# Patient Record
Sex: FEMALE | Race: WHITE | Employment: FULL TIME | URBAN - METROPOLITAN AREA
[De-identification: names, ages, dates, MRNs, and addresses within clinical notes are randomized per-mention and may not be internally consistent; named-entity substitution may affect disease eponyms.]

---

## 2019-01-28 ENCOUNTER — HOSPITAL ENCOUNTER (OUTPATIENT)
Age: 49
Setting detail: OBSERVATION
Discharge: HOME | End: 2019-01-29
Attending: EMERGENCY MEDICINE | Admitting: EMERGENCY MEDICINE
Payer: COMMERCIAL

## 2019-01-28 ENCOUNTER — APPOINTMENT (OUTPATIENT)
Dept: CT IMAGING | Age: 49
End: 2019-01-28
Payer: COMMERCIAL

## 2019-01-28 ENCOUNTER — APPOINTMENT (OUTPATIENT)
Dept: GENERAL RADIOLOGY | Age: 49
End: 2019-01-28
Payer: COMMERCIAL

## 2019-01-28 DIAGNOSIS — R07.89 ATYPICAL CHEST PAIN: Primary | ICD-10-CM

## 2019-01-28 LAB
ABSOLUTE EOS #: 0.09 K/UL (ref 0–0.44)
ABSOLUTE IMMATURE GRANULOCYTE: 0.03 K/UL (ref 0–0.3)
ABSOLUTE LYMPH #: 2.51 K/UL (ref 1.1–3.7)
ABSOLUTE MONO #: 0.6 K/UL (ref 0.1–1.2)
ANION GAP SERPL CALCULATED.3IONS-SCNC: 13 MMOL/L (ref 9–17)
BASOPHILS # BLD: 1 % (ref 0–2)
BASOPHILS ABSOLUTE: 0.06 K/UL (ref 0–0.2)
BUN BLDV-MCNC: 11 MG/DL (ref 6–20)
BUN/CREAT BLD: ABNORMAL (ref 9–20)
CALCIUM SERPL-MCNC: 9.6 MG/DL (ref 8.6–10.4)
CHLORIDE BLD-SCNC: 105 MMOL/L (ref 98–107)
CO2: 21 MMOL/L (ref 20–31)
CREAT SERPL-MCNC: 0.63 MG/DL (ref 0.5–0.9)
D-DIMER QUANTITATIVE: 0.62 MG/L FEU
DIFFERENTIAL TYPE: ABNORMAL
EOSINOPHILS RELATIVE PERCENT: 1 % (ref 1–4)
GFR AFRICAN AMERICAN: >60 ML/MIN
GFR NON-AFRICAN AMERICAN: >60 ML/MIN
GFR SERPL CREATININE-BSD FRML MDRD: ABNORMAL ML/MIN/{1.73_M2}
GFR SERPL CREATININE-BSD FRML MDRD: ABNORMAL ML/MIN/{1.73_M2}
GLUCOSE BLD-MCNC: 127 MG/DL (ref 70–99)
HCT VFR BLD CALC: 42 % (ref 36.3–47.1)
HEMOGLOBIN: 14.1 G/DL (ref 11.9–15.1)
IMMATURE GRANULOCYTES: 0 %
LYMPHOCYTES # BLD: 27 % (ref 24–43)
MCH RBC QN AUTO: 32.6 PG (ref 25.2–33.5)
MCHC RBC AUTO-ENTMCNC: 33.6 G/DL (ref 28.4–34.8)
MCV RBC AUTO: 97.2 FL (ref 82.6–102.9)
MONOCYTES # BLD: 6 % (ref 3–12)
NRBC AUTOMATED: 0 PER 100 WBC
PDW BLD-RTO: 11.6 % (ref 11.8–14.4)
PLATELET # BLD: 375 K/UL (ref 138–453)
PLATELET ESTIMATE: ABNORMAL
PMV BLD AUTO: 9.6 FL (ref 8.1–13.5)
POTASSIUM SERPL-SCNC: 4.2 MMOL/L (ref 3.7–5.3)
RBC # BLD: 4.32 M/UL (ref 3.95–5.11)
RBC # BLD: ABNORMAL 10*6/UL
SEG NEUTROPHILS: 65 % (ref 36–65)
SEGMENTED NEUTROPHILS ABSOLUTE COUNT: 6.15 K/UL (ref 1.5–8.1)
SODIUM BLD-SCNC: 139 MMOL/L (ref 135–144)
TROPONIN INTERP: ABNORMAL
TROPONIN T: ABNORMAL NG/ML
TROPONIN, HIGH SENSITIVITY: 31 NG/L (ref 0–14)
TROPONIN, HIGH SENSITIVITY: 35 NG/L (ref 0–14)
TROPONIN, HIGH SENSITIVITY: 36 NG/L (ref 0–14)
TROPONIN, HIGH SENSITIVITY: 41 NG/L (ref 0–14)
WBC # BLD: 9.4 K/UL (ref 3.5–11.3)
WBC # BLD: ABNORMAL 10*3/UL

## 2019-01-28 PROCEDURE — 84484 ASSAY OF TROPONIN QUANT: CPT

## 2019-01-28 PROCEDURE — 6370000000 HC RX 637 (ALT 250 FOR IP): Performed by: STUDENT IN AN ORGANIZED HEALTH CARE EDUCATION/TRAINING PROGRAM

## 2019-01-28 PROCEDURE — 6360000004 HC RX CONTRAST MEDICATION: Performed by: EMERGENCY MEDICINE

## 2019-01-28 PROCEDURE — 2580000003 HC RX 258: Performed by: STUDENT IN AN ORGANIZED HEALTH CARE EDUCATION/TRAINING PROGRAM

## 2019-01-28 PROCEDURE — 96374 THER/PROPH/DIAG INJ IV PUSH: CPT

## 2019-01-28 PROCEDURE — 93005 ELECTROCARDIOGRAM TRACING: CPT

## 2019-01-28 PROCEDURE — 36415 COLL VENOUS BLD VENIPUNCTURE: CPT

## 2019-01-28 PROCEDURE — 85025 COMPLETE CBC W/AUTO DIFF WBC: CPT

## 2019-01-28 PROCEDURE — G0378 HOSPITAL OBSERVATION PER HR: HCPCS

## 2019-01-28 PROCEDURE — 85379 FIBRIN DEGRADATION QUANT: CPT

## 2019-01-28 PROCEDURE — 71260 CT THORAX DX C+: CPT

## 2019-01-28 PROCEDURE — 71046 X-RAY EXAM CHEST 2 VIEWS: CPT

## 2019-01-28 PROCEDURE — G0384 LEV 5 HOSP TYPE B ED VISIT: HCPCS

## 2019-01-28 PROCEDURE — 80048 BASIC METABOLIC PNL TOTAL CA: CPT

## 2019-01-28 PROCEDURE — 6360000002 HC RX W HCPCS: Performed by: STUDENT IN AN ORGANIZED HEALTH CARE EDUCATION/TRAINING PROGRAM

## 2019-01-28 RX ORDER — LORAZEPAM 2 MG/ML
1 INJECTION INTRAMUSCULAR ONCE
Status: COMPLETED | OUTPATIENT
Start: 2019-01-28 | End: 2019-01-28

## 2019-01-28 RX ORDER — LORAZEPAM 0.5 MG/1
1 TABLET ORAL ONCE
Status: DISCONTINUED | OUTPATIENT
Start: 2019-01-28 | End: 2019-01-28

## 2019-01-28 RX ORDER — SODIUM CHLORIDE 0.9 % (FLUSH) 0.9 %
10 SYRINGE (ML) INJECTION PRN
Status: DISCONTINUED | OUTPATIENT
Start: 2019-01-28 | End: 2019-01-29 | Stop reason: HOSPADM

## 2019-01-28 RX ORDER — CEFUROXIME AXETIL 250 MG/1
250 TABLET ORAL 2 TIMES DAILY
Status: DISCONTINUED | OUTPATIENT
Start: 2019-01-28 | End: 2019-01-29 | Stop reason: HOSPADM

## 2019-01-28 RX ORDER — SODIUM CHLORIDE 0.9 % (FLUSH) 0.9 %
10 SYRINGE (ML) INJECTION EVERY 12 HOURS SCHEDULED
Status: DISCONTINUED | OUTPATIENT
Start: 2019-01-28 | End: 2019-01-29 | Stop reason: HOSPADM

## 2019-01-28 RX ORDER — ASPIRIN 81 MG/1
324 TABLET, CHEWABLE ORAL ONCE
Status: COMPLETED | OUTPATIENT
Start: 2019-01-28 | End: 2019-01-28

## 2019-01-28 RX ORDER — CEFUROXIME AXETIL 250 MG/1
250 TABLET ORAL 2 TIMES DAILY
COMMUNITY
Start: 2019-01-28 | End: 2019-01-31

## 2019-01-28 RX ORDER — NAPROXEN 250 MG/1
250 TABLET ORAL 3 TIMES DAILY PRN
Status: DISCONTINUED | OUTPATIENT
Start: 2019-01-28 | End: 2019-01-29 | Stop reason: HOSPADM

## 2019-01-28 RX ADMIN — IOVERSOL 75 ML: 741 INJECTION INTRA-ARTERIAL; INTRAVENOUS at 13:26

## 2019-01-28 RX ADMIN — CEFUROXIME AXETIL 250 MG: 250 TABLET ORAL at 20:50

## 2019-01-28 RX ADMIN — LORAZEPAM 1 MG: 2 INJECTION INTRAMUSCULAR; INTRAVENOUS at 11:53

## 2019-01-28 RX ADMIN — ASPIRIN 81 MG 324 MG: 81 TABLET ORAL at 11:53

## 2019-01-28 RX ADMIN — NAPROXEN 250 MG: 250 TABLET ORAL at 17:30

## 2019-01-28 RX ADMIN — Medication 10 ML: at 20:50

## 2019-01-28 ASSESSMENT — PAIN DESCRIPTION - PROGRESSION: CLINICAL_PROGRESSION: NOT CHANGED

## 2019-01-28 ASSESSMENT — PAIN DESCRIPTION - ORIENTATION: ORIENTATION: LEFT;LOWER

## 2019-01-28 ASSESSMENT — PAIN DESCRIPTION - LOCATION: LOCATION: BACK

## 2019-01-28 ASSESSMENT — ENCOUNTER SYMPTOMS
WHEEZING: 0
CHEST TIGHTNESS: 1
COUGH: 0
ABDOMINAL PAIN: 0
SHORTNESS OF BREATH: 0
NAUSEA: 0
BACK PAIN: 1
VOMITING: 0
TROUBLE SWALLOWING: 0
ABDOMINAL DISTENTION: 0
SORE THROAT: 0

## 2019-01-28 ASSESSMENT — PAIN DESCRIPTION - DESCRIPTORS: DESCRIPTORS: ACHING;SHARP

## 2019-01-28 ASSESSMENT — PAIN DESCRIPTION - ONSET: ONSET: ON-GOING

## 2019-01-28 ASSESSMENT — PAIN SCALES - GENERAL
PAINLEVEL_OUTOF10: 6
PAINLEVEL_OUTOF10: 5

## 2019-01-28 ASSESSMENT — PAIN - FUNCTIONAL ASSESSMENT: PAIN_FUNCTIONAL_ASSESSMENT: PREVENTS OR INTERFERES SOME ACTIVE ACTIVITIES AND ADLS

## 2019-01-28 ASSESSMENT — PAIN DESCRIPTION - PAIN TYPE: TYPE: ACUTE PAIN

## 2019-01-28 ASSESSMENT — PAIN DESCRIPTION - FREQUENCY: FREQUENCY: CONTINUOUS

## 2019-01-28 ASSESSMENT — HEART SCORE: ECG: 0

## 2019-01-29 ENCOUNTER — APPOINTMENT (OUTPATIENT)
Dept: CARDIAC CATH/INVASIVE PROCEDURES | Age: 49
End: 2019-01-29
Payer: COMMERCIAL

## 2019-01-29 VITALS
BODY MASS INDEX: 17.84 KG/M2 | RESPIRATION RATE: 18 BRPM | HEART RATE: 77 BPM | OXYGEN SATURATION: 98 % | TEMPERATURE: 99 F | WEIGHT: 111 LBS | DIASTOLIC BLOOD PRESSURE: 61 MMHG | HEIGHT: 66 IN | SYSTOLIC BLOOD PRESSURE: 88 MMHG

## 2019-01-29 LAB
EKG ATRIAL RATE: 67 BPM
EKG ATRIAL RATE: 73 BPM
EKG ATRIAL RATE: 86 BPM
EKG P AXIS: 39 DEGREES
EKG P AXIS: 57 DEGREES
EKG P AXIS: 90 DEGREES
EKG P-R INTERVAL: 138 MS
EKG P-R INTERVAL: 166 MS
EKG P-R INTERVAL: 174 MS
EKG Q-T INTERVAL: 380 MS
EKG Q-T INTERVAL: 400 MS
EKG Q-T INTERVAL: 408 MS
EKG QRS DURATION: 82 MS
EKG QTC CALCULATION (BAZETT): 431 MS
EKG QTC CALCULATION (BAZETT): 440 MS
EKG QTC CALCULATION (BAZETT): 454 MS
EKG R AXIS: 24 DEGREES
EKG R AXIS: 32 DEGREES
EKG R AXIS: 46 DEGREES
EKG T AXIS: 54 DEGREES
EKG T AXIS: 56 DEGREES
EKG T AXIS: 83 DEGREES
EKG VENTRICULAR RATE: 67 BPM
EKG VENTRICULAR RATE: 73 BPM
EKG VENTRICULAR RATE: 86 BPM
HCG, PREGNANCY URINE (POC): NEGATIVE
LV EF: 55 %
LV EF: 55 %
LVEF MODALITY: NORMAL
LVEF MODALITY: NORMAL
TROPONIN INTERP: ABNORMAL
TROPONIN T: ABNORMAL NG/ML
TROPONIN, HIGH SENSITIVITY: 30 NG/L (ref 0–14)

## 2019-01-29 PROCEDURE — 93306 TTE W/DOPPLER COMPLETE: CPT

## 2019-01-29 PROCEDURE — 84484 ASSAY OF TROPONIN QUANT: CPT

## 2019-01-29 PROCEDURE — C1760 CLOSURE DEV, VASC: HCPCS

## 2019-01-29 PROCEDURE — G0378 HOSPITAL OBSERVATION PER HR: HCPCS

## 2019-01-29 PROCEDURE — 2709999900 HC NON-CHARGEABLE SUPPLY

## 2019-01-29 PROCEDURE — 2500000003 HC RX 250 WO HCPCS

## 2019-01-29 PROCEDURE — 6360000002 HC RX W HCPCS

## 2019-01-29 PROCEDURE — C1894 INTRO/SHEATH, NON-LASER: HCPCS

## 2019-01-29 PROCEDURE — 93458 L HRT ARTERY/VENTRICLE ANGIO: CPT

## 2019-01-29 PROCEDURE — 2580000003 HC RX 258: Performed by: STUDENT IN AN ORGANIZED HEALTH CARE EDUCATION/TRAINING PROGRAM

## 2019-01-29 PROCEDURE — 6360000004 HC RX CONTRAST MEDICATION

## 2019-01-29 PROCEDURE — 93226 XTRNL ECG REC<48 HR SCAN A/R: CPT

## 2019-01-29 PROCEDURE — 93005 ELECTROCARDIOGRAM TRACING: CPT

## 2019-01-29 PROCEDURE — 93225 XTRNL ECG REC<48 HRS REC: CPT

## 2019-01-29 PROCEDURE — C1769 GUIDE WIRE: HCPCS

## 2019-01-29 PROCEDURE — 36415 COLL VENOUS BLD VENIPUNCTURE: CPT

## 2019-01-29 PROCEDURE — 84703 CHORIONIC GONADOTROPIN ASSAY: CPT

## 2019-01-29 RX ORDER — ACETAMINOPHEN 325 MG/1
650 TABLET ORAL EVERY 4 HOURS PRN
Status: DISCONTINUED | OUTPATIENT
Start: 2019-01-29 | End: 2019-01-29 | Stop reason: HOSPADM

## 2019-01-29 RX ORDER — SODIUM CHLORIDE 0.9 % (FLUSH) 0.9 %
10 SYRINGE (ML) INJECTION PRN
Status: DISCONTINUED | OUTPATIENT
Start: 2019-01-29 | End: 2019-01-29 | Stop reason: HOSPADM

## 2019-01-29 RX ORDER — SODIUM CHLORIDE 0.9 % (FLUSH) 0.9 %
10 SYRINGE (ML) INJECTION EVERY 12 HOURS SCHEDULED
Status: DISCONTINUED | OUTPATIENT
Start: 2019-01-29 | End: 2019-01-29 | Stop reason: HOSPADM

## 2019-01-29 RX ADMIN — Medication 10 ML: at 09:00

## 2019-01-29 ASSESSMENT — PAIN DESCRIPTION - PAIN TYPE: TYPE: ACUTE PAIN

## 2019-01-29 ASSESSMENT — PAIN DESCRIPTION - FREQUENCY: FREQUENCY: INTERMITTENT

## 2019-01-29 ASSESSMENT — PAIN SCALES - GENERAL: PAINLEVEL_OUTOF10: 5

## 2019-01-29 ASSESSMENT — PAIN DESCRIPTION - ORIENTATION: ORIENTATION: MID;LEFT

## 2019-01-29 ASSESSMENT — PAIN - FUNCTIONAL ASSESSMENT: PAIN_FUNCTIONAL_ASSESSMENT: PREVENTS OR INTERFERES SOME ACTIVE ACTIVITIES AND ADLS

## 2019-01-29 ASSESSMENT — PAIN DESCRIPTION - ONSET: ONSET: ON-GOING

## 2019-01-29 ASSESSMENT — PAIN DESCRIPTION - LOCATION: LOCATION: CHEST

## 2019-01-29 ASSESSMENT — PAIN DESCRIPTION - PROGRESSION: CLINICAL_PROGRESSION: NOT CHANGED

## 2019-01-29 ASSESSMENT — PAIN DESCRIPTION - DESCRIPTORS: DESCRIPTORS: PRESSURE

## 2019-02-01 ENCOUNTER — HOSPITAL ENCOUNTER (OUTPATIENT)
Dept: VASCULAR LAB | Age: 49
Discharge: HOME | End: 2019-02-01
Payer: COMMERCIAL

## 2019-02-01 PROCEDURE — 93971 EXTREMITY STUDY: CPT

## 2019-02-04 ENCOUNTER — HOSPITAL ENCOUNTER (EMERGENCY)
Age: 49
Discharge: HOME | End: 2019-02-04
Attending: EMERGENCY MEDICINE
Payer: COMMERCIAL

## 2019-02-04 VITALS
HEART RATE: 100 BPM | OXYGEN SATURATION: 100 % | BODY MASS INDEX: 18.49 KG/M2 | DIASTOLIC BLOOD PRESSURE: 87 MMHG | SYSTOLIC BLOOD PRESSURE: 122 MMHG | TEMPERATURE: 98.2 F | WEIGHT: 111 LBS | HEIGHT: 65 IN | RESPIRATION RATE: 19 BRPM

## 2019-02-04 DIAGNOSIS — R10.13 ABDOMINAL PAIN, EPIGASTRIC: Primary | ICD-10-CM

## 2019-02-04 DIAGNOSIS — R13.10 DYSPHAGIA, UNSPECIFIED TYPE: ICD-10-CM

## 2019-02-04 LAB
ABSOLUTE EOS #: 0.06 K/UL (ref 0–0.44)
ABSOLUTE IMMATURE GRANULOCYTE: 0.04 K/UL (ref 0–0.3)
ABSOLUTE LYMPH #: 1.67 K/UL (ref 1.1–3.7)
ABSOLUTE MONO #: 0.59 K/UL (ref 0.1–1.2)
ALBUMIN SERPL-MCNC: 4.9 G/DL (ref 3.5–5.2)
ALBUMIN/GLOBULIN RATIO: 1.4 (ref 1–2.5)
ALP BLD-CCNC: 69 U/L (ref 35–104)
ALT SERPL-CCNC: 12 U/L (ref 5–33)
ANION GAP SERPL CALCULATED.3IONS-SCNC: 12 MMOL/L (ref 9–17)
AST SERPL-CCNC: 20 U/L
BASOPHILS # BLD: 1 % (ref 0–2)
BASOPHILS ABSOLUTE: 0.04 K/UL (ref 0–0.2)
BILIRUB SERPL-MCNC: 0.5 MG/DL (ref 0.3–1.2)
BILIRUBIN URINE: NEGATIVE
BUN BLDV-MCNC: 9 MG/DL (ref 6–20)
BUN/CREAT BLD: ABNORMAL (ref 9–20)
CALCIUM SERPL-MCNC: 9.4 MG/DL (ref 8.6–10.4)
CHLORIDE BLD-SCNC: 103 MMOL/L (ref 98–107)
CO2: 23 MMOL/L (ref 20–31)
COLOR: YELLOW
COMMENT UA: NORMAL
CREAT SERPL-MCNC: 0.61 MG/DL (ref 0.5–0.9)
DIFFERENTIAL TYPE: ABNORMAL
EKG ATRIAL RATE: 81 BPM
EKG P AXIS: 79 DEGREES
EKG P-R INTERVAL: 134 MS
EKG Q-T INTERVAL: 370 MS
EKG QRS DURATION: 82 MS
EKG QTC CALCULATION (BAZETT): 429 MS
EKG R AXIS: 48 DEGREES
EKG T AXIS: 66 DEGREES
EKG VENTRICULAR RATE: 81 BPM
EOSINOPHILS RELATIVE PERCENT: 1 % (ref 1–4)
GFR AFRICAN AMERICAN: >60 ML/MIN
GFR NON-AFRICAN AMERICAN: >60 ML/MIN
GFR SERPL CREATININE-BSD FRML MDRD: ABNORMAL ML/MIN/{1.73_M2}
GFR SERPL CREATININE-BSD FRML MDRD: ABNORMAL ML/MIN/{1.73_M2}
GLUCOSE BLD-MCNC: 113 MG/DL (ref 70–99)
GLUCOSE URINE: NEGATIVE
HCG(URINE) PREGNANCY TEST: NEGATIVE
HCT VFR BLD CALC: 40.1 % (ref 36.3–47.1)
HEMOGLOBIN: 13.6 G/DL (ref 11.9–15.1)
IMMATURE GRANULOCYTES: 1 %
KETONES, URINE: NEGATIVE
LEUKOCYTE ESTERASE, URINE: NEGATIVE
LIPASE: 20 U/L (ref 13–60)
LYMPHOCYTES # BLD: 20 % (ref 24–43)
MCH RBC QN AUTO: 33.3 PG (ref 25.2–33.5)
MCHC RBC AUTO-ENTMCNC: 33.9 G/DL (ref 28.4–34.8)
MCV RBC AUTO: 98.3 FL (ref 82.6–102.9)
MONOCYTES # BLD: 7 % (ref 3–12)
NITRITE, URINE: NEGATIVE
NRBC AUTOMATED: 0 PER 100 WBC
PDW BLD-RTO: 11.4 % (ref 11.8–14.4)
PH UA: 6.5 (ref 5–8)
PLATELET # BLD: 384 K/UL (ref 138–453)
PLATELET ESTIMATE: ABNORMAL
PMV BLD AUTO: 9.1 FL (ref 8.1–13.5)
POTASSIUM SERPL-SCNC: 3.5 MMOL/L (ref 3.7–5.3)
PROTEIN UA: NEGATIVE
RBC # BLD: 4.08 M/UL (ref 3.95–5.11)
RBC # BLD: ABNORMAL 10*6/UL
SEG NEUTROPHILS: 70 % (ref 36–65)
SEGMENTED NEUTROPHILS ABSOLUTE COUNT: 5.8 K/UL (ref 1.5–8.1)
SODIUM BLD-SCNC: 138 MMOL/L (ref 135–144)
SPECIFIC GRAVITY UA: 1 (ref 1–1.03)
TOTAL PROTEIN: 8.4 G/DL (ref 6.4–8.3)
TSH SERPL DL<=0.05 MIU/L-ACNC: 2.33 MIU/L (ref 0.3–5)
TURBIDITY: CLEAR
URINE HGB: NEGATIVE
UROBILINOGEN, URINE: NORMAL
WBC # BLD: 8.2 K/UL (ref 3.5–11.3)
WBC # BLD: ABNORMAL 10*3/UL

## 2019-02-04 PROCEDURE — 84443 ASSAY THYROID STIM HORMONE: CPT

## 2019-02-04 PROCEDURE — 93005 ELECTROCARDIOGRAM TRACING: CPT

## 2019-02-04 PROCEDURE — 2580000003 HC RX 258: Performed by: NURSE PRACTITIONER

## 2019-02-04 PROCEDURE — 83690 ASSAY OF LIPASE: CPT

## 2019-02-04 PROCEDURE — 80053 COMPREHEN METABOLIC PANEL: CPT

## 2019-02-04 PROCEDURE — 84703 CHORIONIC GONADOTROPIN ASSAY: CPT

## 2019-02-04 PROCEDURE — 81003 URINALYSIS AUTO W/O SCOPE: CPT

## 2019-02-04 PROCEDURE — 85025 COMPLETE CBC W/AUTO DIFF WBC: CPT

## 2019-02-04 PROCEDURE — 99284 EMERGENCY DEPT VISIT MOD MDM: CPT

## 2019-02-04 RX ORDER — 0.9 % SODIUM CHLORIDE 0.9 %
1000 INTRAVENOUS SOLUTION INTRAVENOUS ONCE
Status: COMPLETED | OUTPATIENT
Start: 2019-02-04 | End: 2019-02-04

## 2019-02-04 RX ADMIN — SODIUM CHLORIDE 1000 ML: 9 INJECTION, SOLUTION INTRAVENOUS at 10:48

## 2019-02-04 ASSESSMENT — ENCOUNTER SYMPTOMS
BACK PAIN: 1
SHORTNESS OF BREATH: 0
ABDOMINAL PAIN: 1
VOMITING: 0
NAUSEA: 1
DIARRHEA: 0
COUGH: 0
CONSTIPATION: 0

## 2019-02-04 ASSESSMENT — PAIN DESCRIPTION - DESCRIPTORS: DESCRIPTORS: DISCOMFORT

## 2019-02-04 ASSESSMENT — PAIN SCALES - GENERAL: PAINLEVEL_OUTOF10: 3

## 2019-02-04 ASSESSMENT — PAIN DESCRIPTION - ORIENTATION: ORIENTATION: UPPER

## 2019-02-04 ASSESSMENT — PAIN DESCRIPTION - LOCATION: LOCATION: ABDOMEN

## 2019-02-04 ASSESSMENT — PAIN DESCRIPTION - PAIN TYPE: TYPE: ACUTE PAIN

## 2019-02-18 ENCOUNTER — APPOINTMENT (OUTPATIENT)
Dept: GENERAL RADIOLOGY | Age: 49
End: 2019-02-18
Payer: COMMERCIAL

## 2019-02-18 ENCOUNTER — HOSPITAL ENCOUNTER (OUTPATIENT)
Age: 49
Setting detail: OBSERVATION
Discharge: HOME | End: 2019-02-19
Attending: EMERGENCY MEDICINE | Admitting: EMERGENCY MEDICINE
Payer: COMMERCIAL

## 2019-02-18 DIAGNOSIS — R20.2 TINGLING IN EXTREMITIES: ICD-10-CM

## 2019-02-18 DIAGNOSIS — M79.10 MUSCLE PAIN: Primary | ICD-10-CM

## 2019-02-18 LAB
ABSOLUTE EOS #: 0.06 K/UL (ref 0–0.44)
ABSOLUTE IMMATURE GRANULOCYTE: <0.03 K/UL (ref 0–0.3)
ABSOLUTE LYMPH #: 2.43 K/UL (ref 1.1–3.7)
ABSOLUTE MONO #: 0.64 K/UL (ref 0.1–1.2)
ALBUMIN SERPL-MCNC: 4.4 G/DL (ref 3.5–5.2)
ALBUMIN/GLOBULIN RATIO: 1.4 (ref 1–2.5)
ALP BLD-CCNC: 56 U/L (ref 35–104)
ALT SERPL-CCNC: 10 U/L (ref 5–33)
ANION GAP SERPL CALCULATED.3IONS-SCNC: 12 MMOL/L (ref 9–17)
AST SERPL-CCNC: 15 U/L
BASOPHILS # BLD: 1 % (ref 0–2)
BASOPHILS ABSOLUTE: 0.05 K/UL (ref 0–0.2)
BILIRUB SERPL-MCNC: 0.71 MG/DL (ref 0.3–1.2)
BUN BLDV-MCNC: 9 MG/DL (ref 6–20)
BUN/CREAT BLD: ABNORMAL (ref 9–20)
C-REACTIVE PROTEIN: <0.3 MG/L (ref 0–5)
CALCIUM SERPL-MCNC: 9.3 MG/DL (ref 8.6–10.4)
CHLORIDE BLD-SCNC: 108 MMOL/L (ref 98–107)
CO2: 22 MMOL/L (ref 20–31)
CREAT SERPL-MCNC: 0.59 MG/DL (ref 0.5–0.9)
DIFFERENTIAL TYPE: ABNORMAL
EOSINOPHILS RELATIVE PERCENT: 1 % (ref 1–4)
FOLATE: 12.4 NG/ML
GFR AFRICAN AMERICAN: >60 ML/MIN
GFR NON-AFRICAN AMERICAN: >60 ML/MIN
GFR SERPL CREATININE-BSD FRML MDRD: ABNORMAL ML/MIN/{1.73_M2}
GFR SERPL CREATININE-BSD FRML MDRD: ABNORMAL ML/MIN/{1.73_M2}
GLUCOSE BLD-MCNC: 90 MG/DL (ref 70–99)
HCT VFR BLD CALC: 37.5 % (ref 36.3–47.1)
HEMOGLOBIN: 12.9 G/DL (ref 11.9–15.1)
IMMATURE GRANULOCYTES: 0 %
LYMPHOCYTES # BLD: 29 % (ref 24–43)
MAGNESIUM: 2 MG/DL (ref 1.6–2.6)
MCH RBC QN AUTO: 32.6 PG (ref 25.2–33.5)
MCHC RBC AUTO-ENTMCNC: 34.4 G/DL (ref 28.4–34.8)
MCV RBC AUTO: 94.7 FL (ref 82.6–102.9)
MONOCYTES # BLD: 8 % (ref 3–12)
MYOGLOBIN: <21 NG/ML (ref 25–58)
NRBC AUTOMATED: 0 PER 100 WBC
PDW BLD-RTO: 11.5 % (ref 11.8–14.4)
PHOSPHORUS: 4.1 MG/DL (ref 2.6–4.5)
PLATELET # BLD: 328 K/UL (ref 138–453)
PLATELET ESTIMATE: ABNORMAL
PMV BLD AUTO: 8.9 FL (ref 8.1–13.5)
POTASSIUM SERPL-SCNC: 3.9 MMOL/L (ref 3.7–5.3)
RBC # BLD: 3.96 M/UL (ref 3.95–5.11)
RBC # BLD: ABNORMAL 10*6/UL
SEDIMENTATION RATE, ERYTHROCYTE: 6 MM (ref 0–20)
SEG NEUTROPHILS: 61 % (ref 36–65)
SEGMENTED NEUTROPHILS ABSOLUTE COUNT: 5.27 K/UL (ref 1.5–8.1)
SODIUM BLD-SCNC: 142 MMOL/L (ref 135–144)
TOTAL CK: 54 U/L (ref 26–192)
TOTAL PROTEIN: 7.5 G/DL (ref 6.4–8.3)
TROPONIN INTERP: ABNORMAL
TROPONIN T: ABNORMAL NG/ML
TROPONIN, HIGH SENSITIVITY: 35 NG/L (ref 0–14)
TSH SERPL DL<=0.05 MIU/L-ACNC: 1.85 MIU/L (ref 0.3–5)
VITAMIN B-12: 470 PG/ML (ref 232–1245)
WBC # BLD: 8.5 K/UL (ref 3.5–11.3)
WBC # BLD: ABNORMAL 10*3/UL

## 2019-02-18 PROCEDURE — 2580000003 HC RX 258: Performed by: FAMILY MEDICINE

## 2019-02-18 PROCEDURE — 82550 ASSAY OF CK (CPK): CPT

## 2019-02-18 PROCEDURE — 6370000000 HC RX 637 (ALT 250 FOR IP): Performed by: EMERGENCY MEDICINE

## 2019-02-18 PROCEDURE — G0378 HOSPITAL OBSERVATION PER HR: HCPCS

## 2019-02-18 PROCEDURE — 96374 THER/PROPH/DIAG INJ IV PUSH: CPT

## 2019-02-18 PROCEDURE — 73090 X-RAY EXAM OF FOREARM: CPT

## 2019-02-18 PROCEDURE — 93005 ELECTROCARDIOGRAM TRACING: CPT

## 2019-02-18 PROCEDURE — 71045 X-RAY EXAM CHEST 1 VIEW: CPT

## 2019-02-18 PROCEDURE — 82607 VITAMIN B-12: CPT

## 2019-02-18 PROCEDURE — 85651 RBC SED RATE NONAUTOMATED: CPT

## 2019-02-18 PROCEDURE — 84484 ASSAY OF TROPONIN QUANT: CPT

## 2019-02-18 PROCEDURE — 86038 ANTINUCLEAR ANTIBODIES: CPT

## 2019-02-18 PROCEDURE — 84443 ASSAY THYROID STIM HORMONE: CPT

## 2019-02-18 PROCEDURE — 85025 COMPLETE CBC W/AUTO DIFF WBC: CPT

## 2019-02-18 PROCEDURE — 80053 COMPREHEN METABOLIC PANEL: CPT

## 2019-02-18 PROCEDURE — 82525 ASSAY OF COPPER: CPT

## 2019-02-18 PROCEDURE — 36415 COLL VENOUS BLD VENIPUNCTURE: CPT

## 2019-02-18 PROCEDURE — 2580000003 HC RX 258: Performed by: EMERGENCY MEDICINE

## 2019-02-18 PROCEDURE — 83735 ASSAY OF MAGNESIUM: CPT

## 2019-02-18 PROCEDURE — 83874 ASSAY OF MYOGLOBIN: CPT

## 2019-02-18 PROCEDURE — 84100 ASSAY OF PHOSPHORUS: CPT

## 2019-02-18 PROCEDURE — 6370000000 HC RX 637 (ALT 250 FOR IP): Performed by: FAMILY MEDICINE

## 2019-02-18 PROCEDURE — 83036 HEMOGLOBIN GLYCOSYLATED A1C: CPT

## 2019-02-18 PROCEDURE — 86140 C-REACTIVE PROTEIN: CPT

## 2019-02-18 PROCEDURE — 99285 EMERGENCY DEPT VISIT HI MDM: CPT

## 2019-02-18 PROCEDURE — 6360000002 HC RX W HCPCS: Performed by: EMERGENCY MEDICINE

## 2019-02-18 PROCEDURE — 82746 ASSAY OF FOLIC ACID SERUM: CPT

## 2019-02-18 RX ORDER — SODIUM CHLORIDE 0.9 % (FLUSH) 0.9 %
10 SYRINGE (ML) INJECTION PRN
Status: DISCONTINUED | OUTPATIENT
Start: 2019-02-18 | End: 2019-02-19 | Stop reason: HOSPADM

## 2019-02-18 RX ORDER — ONDANSETRON 2 MG/ML
4 INJECTION INTRAMUSCULAR; INTRAVENOUS EVERY 6 HOURS PRN
Status: DISCONTINUED | OUTPATIENT
Start: 2019-02-18 | End: 2019-02-19 | Stop reason: HOSPADM

## 2019-02-18 RX ORDER — LABETALOL HYDROCHLORIDE 5 MG/ML
10 INJECTION, SOLUTION INTRAVENOUS EVERY 10 MIN PRN
Status: DISCONTINUED | OUTPATIENT
Start: 2019-02-18 | End: 2019-02-19 | Stop reason: HOSPADM

## 2019-02-18 RX ORDER — ALPRAZOLAM 0.25 MG/1
0.25 TABLET ORAL NIGHTLY PRN
COMMUNITY
End: 2020-11-10 | Stop reason: SDUPTHER

## 2019-02-18 RX ORDER — ASPIRIN 81 MG/1
81 TABLET ORAL DAILY
Status: DISCONTINUED | OUTPATIENT
Start: 2019-02-18 | End: 2019-02-19 | Stop reason: HOSPADM

## 2019-02-18 RX ORDER — ONDANSETRON 2 MG/ML
4 INJECTION INTRAMUSCULAR; INTRAVENOUS EVERY 8 HOURS PRN
Status: DISCONTINUED | OUTPATIENT
Start: 2019-02-18 | End: 2019-02-19 | Stop reason: HOSPADM

## 2019-02-18 RX ORDER — SODIUM CHLORIDE 0.9 % (FLUSH) 0.9 %
10 SYRINGE (ML) INJECTION EVERY 12 HOURS SCHEDULED
Status: DISCONTINUED | OUTPATIENT
Start: 2019-02-18 | End: 2019-02-19 | Stop reason: HOSPADM

## 2019-02-18 RX ORDER — ALPRAZOLAM 0.25 MG/1
0.25 TABLET ORAL 3 TIMES DAILY PRN
Status: DISCONTINUED | OUTPATIENT
Start: 2019-02-18 | End: 2019-02-19 | Stop reason: HOSPADM

## 2019-02-18 RX ORDER — ACETAMINOPHEN 325 MG/1
650 TABLET ORAL EVERY 4 HOURS PRN
Status: DISCONTINUED | OUTPATIENT
Start: 2019-02-18 | End: 2019-02-19 | Stop reason: HOSPADM

## 2019-02-18 RX ORDER — KETOROLAC TROMETHAMINE 15 MG/ML
15 INJECTION, SOLUTION INTRAMUSCULAR; INTRAVENOUS ONCE
Status: COMPLETED | OUTPATIENT
Start: 2019-02-18 | End: 2019-02-18

## 2019-02-18 RX ORDER — SODIUM CHLORIDE 9 MG/ML
INJECTION, SOLUTION INTRAVENOUS CONTINUOUS
Status: DISCONTINUED | OUTPATIENT
Start: 2019-02-19 | End: 2019-02-19 | Stop reason: HOSPADM

## 2019-02-18 RX ORDER — ATORVASTATIN CALCIUM 40 MG/1
40 TABLET, FILM COATED ORAL NIGHTLY
Status: DISCONTINUED | OUTPATIENT
Start: 2019-02-18 | End: 2019-02-19 | Stop reason: HOSPADM

## 2019-02-18 RX ADMIN — SODIUM CHLORIDE: 9 INJECTION, SOLUTION INTRAVENOUS at 23:55

## 2019-02-18 RX ADMIN — Medication 10 ML: at 23:54

## 2019-02-18 RX ADMIN — ALPRAZOLAM 0.25 MG: 0.25 TABLET ORAL at 23:51

## 2019-02-18 RX ADMIN — DESMOPRESSIN ACETATE 40 MG: 0.2 TABLET ORAL at 23:54

## 2019-02-18 RX ADMIN — ASPIRIN 81 MG: 81 TABLET ORAL at 23:54

## 2019-02-18 RX ADMIN — KETOROLAC TROMETHAMINE 15 MG: 15 INJECTION, SOLUTION INTRAMUSCULAR; INTRAVENOUS at 23:52

## 2019-02-18 RX ADMIN — Medication 10 ML: at 23:52

## 2019-02-18 ASSESSMENT — ENCOUNTER SYMPTOMS
SHORTNESS OF BREATH: 0
SORE THROAT: 0
VOMITING: 0
ABDOMINAL PAIN: 1
NAUSEA: 0

## 2019-02-18 ASSESSMENT — PAIN SCALES - GENERAL
PAINLEVEL_OUTOF10: 5
PAINLEVEL_OUTOF10: 6

## 2019-02-18 ASSESSMENT — PAIN DESCRIPTION - PAIN TYPE: TYPE: ACUTE PAIN

## 2019-02-19 ENCOUNTER — APPOINTMENT (OUTPATIENT)
Dept: CT IMAGING | Age: 49
End: 2019-02-19
Payer: COMMERCIAL

## 2019-02-19 ENCOUNTER — APPOINTMENT (OUTPATIENT)
Dept: MRI IMAGING | Age: 49
End: 2019-02-19
Payer: COMMERCIAL

## 2019-02-19 VITALS
BODY MASS INDEX: 18.33 KG/M2 | HEART RATE: 82 BPM | TEMPERATURE: 97 F | HEIGHT: 65 IN | OXYGEN SATURATION: 99 % | RESPIRATION RATE: 16 BRPM | WEIGHT: 110 LBS | DIASTOLIC BLOOD PRESSURE: 57 MMHG | SYSTOLIC BLOOD PRESSURE: 90 MMHG

## 2019-02-19 LAB
ABSOLUTE EOS #: 0.1 K/UL (ref 0–0.44)
ABSOLUTE IMMATURE GRANULOCYTE: <0.03 K/UL (ref 0–0.3)
ABSOLUTE LYMPH #: 1.88 K/UL (ref 1.1–3.7)
ABSOLUTE MONO #: 0.57 K/UL (ref 0.1–1.2)
ANION GAP SERPL CALCULATED.3IONS-SCNC: 11 MMOL/L (ref 9–17)
ANTI-NUCLEAR ANTIBODY (ANA): NEGATIVE
BASOPHILS # BLD: 1 % (ref 0–2)
BASOPHILS ABSOLUTE: 0.05 K/UL (ref 0–0.2)
BUN BLDV-MCNC: 15 MG/DL (ref 6–20)
BUN/CREAT BLD: ABNORMAL (ref 9–20)
CALCIUM SERPL-MCNC: 8.6 MG/DL (ref 8.6–10.4)
CHLORIDE BLD-SCNC: 108 MMOL/L (ref 98–107)
CHOLESTEROL/HDL RATIO: 1.9
CHOLESTEROL: 124 MG/DL
CO2: 21 MMOL/L (ref 20–31)
CREAT SERPL-MCNC: 0.65 MG/DL (ref 0.5–0.9)
DIFFERENTIAL TYPE: ABNORMAL
EKG ATRIAL RATE: 68 BPM
EKG ATRIAL RATE: 74 BPM
EKG ATRIAL RATE: 75 BPM
EKG P AXIS: 16 DEGREES
EKG P AXIS: 49 DEGREES
EKG P AXIS: 75 DEGREES
EKG P-R INTERVAL: 158 MS
EKG P-R INTERVAL: 162 MS
EKG P-R INTERVAL: 170 MS
EKG Q-T INTERVAL: 392 MS
EKG Q-T INTERVAL: 392 MS
EKG Q-T INTERVAL: 406 MS
EKG QRS DURATION: 76 MS
EKG QRS DURATION: 80 MS
EKG QRS DURATION: 86 MS
EKG QTC CALCULATION (BAZETT): 431 MS
EKG QTC CALCULATION (BAZETT): 435 MS
EKG QTC CALCULATION (BAZETT): 437 MS
EKG R AXIS: 26 DEGREES
EKG R AXIS: 27 DEGREES
EKG R AXIS: 41 DEGREES
EKG T AXIS: 46 DEGREES
EKG T AXIS: 52 DEGREES
EKG T AXIS: 63 DEGREES
EKG VENTRICULAR RATE: 68 BPM
EKG VENTRICULAR RATE: 74 BPM
EKG VENTRICULAR RATE: 75 BPM
EOSINOPHILS RELATIVE PERCENT: 2 % (ref 1–4)
ESTIMATED AVERAGE GLUCOSE: 97 MG/DL
GFR AFRICAN AMERICAN: >60 ML/MIN
GFR NON-AFRICAN AMERICAN: >60 ML/MIN
GFR SERPL CREATININE-BSD FRML MDRD: ABNORMAL ML/MIN/{1.73_M2}
GFR SERPL CREATININE-BSD FRML MDRD: ABNORMAL ML/MIN/{1.73_M2}
GLUCOSE BLD-MCNC: 89 MG/DL (ref 70–99)
HBA1C MFR BLD: 5 % (ref 4–6)
HCT VFR BLD CALC: 32.7 % (ref 36.3–47.1)
HDLC SERPL-MCNC: 66 MG/DL
HEMOGLOBIN: 11.1 G/DL (ref 11.9–15.1)
IMMATURE GRANULOCYTES: 0 %
LDL CHOLESTEROL: 50 MG/DL (ref 0–130)
LYMPHOCYTES # BLD: 32 % (ref 24–43)
MCH RBC QN AUTO: 32.8 PG (ref 25.2–33.5)
MCHC RBC AUTO-ENTMCNC: 33.9 G/DL (ref 28.4–34.8)
MCV RBC AUTO: 96.7 FL (ref 82.6–102.9)
MONOCYTES # BLD: 10 % (ref 3–12)
NRBC AUTOMATED: 0 PER 100 WBC
PDW BLD-RTO: 11.5 % (ref 11.8–14.4)
PLATELET # BLD: 281 K/UL (ref 138–453)
PLATELET ESTIMATE: ABNORMAL
PMV BLD AUTO: 9.3 FL (ref 8.1–13.5)
POTASSIUM SERPL-SCNC: 3.8 MMOL/L (ref 3.7–5.3)
RBC # BLD: 3.38 M/UL (ref 3.95–5.11)
RBC # BLD: ABNORMAL 10*6/UL
SEDIMENTATION RATE, ERYTHROCYTE: 6 MM (ref 0–20)
SEG NEUTROPHILS: 55 % (ref 36–65)
SEGMENTED NEUTROPHILS ABSOLUTE COUNT: 3.21 K/UL (ref 1.5–8.1)
SODIUM BLD-SCNC: 140 MMOL/L (ref 135–144)
TRIGL SERPL-MCNC: 41 MG/DL
TROPONIN INTERP: ABNORMAL
TROPONIN T: ABNORMAL NG/ML
TROPONIN, HIGH SENSITIVITY: 32 NG/L (ref 0–14)
VLDLC SERPL CALC-MCNC: NORMAL MG/DL (ref 1–30)
WBC # BLD: 5.8 K/UL (ref 3.5–11.3)
WBC # BLD: ABNORMAL 10*3/UL

## 2019-02-19 PROCEDURE — 36415 COLL VENOUS BLD VENIPUNCTURE: CPT

## 2019-02-19 PROCEDURE — 99219 PR INITIAL OBSERVATION CARE/DAY 50 MINUTES: CPT | Performed by: STUDENT IN AN ORGANIZED HEALTH CARE EDUCATION/TRAINING PROGRAM

## 2019-02-19 PROCEDURE — 2580000003 HC RX 258: Performed by: EMERGENCY MEDICINE

## 2019-02-19 PROCEDURE — 80048 BASIC METABOLIC PNL TOTAL CA: CPT

## 2019-02-19 PROCEDURE — G0378 HOSPITAL OBSERVATION PER HR: HCPCS

## 2019-02-19 PROCEDURE — 6370000000 HC RX 637 (ALT 250 FOR IP): Performed by: FAMILY MEDICINE

## 2019-02-19 PROCEDURE — A9576 INJ PROHANCE MULTIPACK: HCPCS | Performed by: FAMILY MEDICINE

## 2019-02-19 PROCEDURE — 85025 COMPLETE CBC W/AUTO DIFF WBC: CPT

## 2019-02-19 PROCEDURE — 6360000004 HC RX CONTRAST MEDICATION: Performed by: FAMILY MEDICINE

## 2019-02-19 PROCEDURE — 86038 ANTINUCLEAR ANTIBODIES: CPT

## 2019-02-19 PROCEDURE — 84484 ASSAY OF TROPONIN QUANT: CPT

## 2019-02-19 PROCEDURE — 70496 CT ANGIOGRAPHY HEAD: CPT

## 2019-02-19 PROCEDURE — 6370000000 HC RX 637 (ALT 250 FOR IP): Performed by: EMERGENCY MEDICINE

## 2019-02-19 PROCEDURE — 70498 CT ANGIOGRAPHY NECK: CPT

## 2019-02-19 PROCEDURE — 70553 MRI BRAIN STEM W/O & W/DYE: CPT

## 2019-02-19 PROCEDURE — 80061 LIPID PANEL: CPT

## 2019-02-19 PROCEDURE — 93005 ELECTROCARDIOGRAM TRACING: CPT

## 2019-02-19 RX ORDER — ATORVASTATIN CALCIUM 40 MG/1
40 TABLET, FILM COATED ORAL NIGHTLY
Qty: 30 TABLET | Refills: 3 | Status: SHIPPED | OUTPATIENT
Start: 2019-02-19 | End: 2019-05-07

## 2019-02-19 RX ORDER — ASPIRIN 81 MG/1
81 TABLET ORAL DAILY
Qty: 30 TABLET | Refills: 3 | Status: SHIPPED | OUTPATIENT
Start: 2019-02-20 | End: 2019-05-07

## 2019-02-19 RX ORDER — SODIUM CHLORIDE 0.9 % (FLUSH) 0.9 %
10 SYRINGE (ML) INJECTION 2 TIMES DAILY
Status: DISCONTINUED | OUTPATIENT
Start: 2019-02-19 | End: 2019-02-19 | Stop reason: HOSPADM

## 2019-02-19 RX ORDER — FOLIC ACID 1 MG/1
2 TABLET ORAL DAILY
Qty: 180 TABLET | Refills: 1 | Status: SHIPPED | OUTPATIENT
Start: 2019-02-19 | End: 2019-05-07

## 2019-02-19 RX ADMIN — ALPRAZOLAM 0.25 MG: 0.25 TABLET ORAL at 09:32

## 2019-02-19 RX ADMIN — SODIUM CHLORIDE: 9 INJECTION, SOLUTION INTRAVENOUS at 15:15

## 2019-02-19 RX ADMIN — ASPIRIN 81 MG: 81 TABLET ORAL at 09:00

## 2019-02-19 RX ADMIN — GADOTERIDOL 10 ML: 279.3 INJECTION, SOLUTION INTRAVENOUS at 11:36

## 2019-02-19 RX ADMIN — IOPAMIDOL 90 ML: 755 INJECTION, SOLUTION INTRAVENOUS at 10:09

## 2019-02-19 ASSESSMENT — PAIN DESCRIPTION - DESCRIPTORS: DESCRIPTORS: SPASM

## 2019-02-19 ASSESSMENT — PAIN SCALES - GENERAL
PAINLEVEL_OUTOF10: 0
PAINLEVEL_OUTOF10: 0
PAINLEVEL_OUTOF10: 5
PAINLEVEL_OUTOF10: 0
PAINLEVEL_OUTOF10: 4

## 2019-02-19 ASSESSMENT — PAIN DESCRIPTION - LOCATION
LOCATION: LEG
LOCATION: BACK

## 2019-02-19 ASSESSMENT — PAIN DESCRIPTION - ORIENTATION
ORIENTATION: POSTERIOR
ORIENTATION: POSTERIOR

## 2019-02-19 ASSESSMENT — PAIN DESCRIPTION - FREQUENCY: FREQUENCY: INTERMITTENT

## 2019-02-19 ASSESSMENT — PAIN DESCRIPTION - PAIN TYPE
TYPE: ACUTE PAIN
TYPE: ACUTE PAIN

## 2019-02-20 LAB — ANTI-NUCLEAR ANTIBODY (ANA): NEGATIVE

## 2019-02-21 LAB — COPPER: 87 UG/DL (ref 80–155)

## 2019-03-01 ENCOUNTER — OFFICE VISIT (OUTPATIENT)
Dept: NEUROLOGY | Age: 49
End: 2019-03-01
Payer: COMMERCIAL

## 2019-03-01 VITALS
HEART RATE: 96 BPM | BODY MASS INDEX: 18.23 KG/M2 | HEIGHT: 65 IN | WEIGHT: 109.4 LBS | DIASTOLIC BLOOD PRESSURE: 79 MMHG | SYSTOLIC BLOOD PRESSURE: 108 MMHG

## 2019-03-01 DIAGNOSIS — F41.9 ANXIETY: ICD-10-CM

## 2019-03-01 DIAGNOSIS — R20.2 PARESTHESIAS: Primary | ICD-10-CM

## 2019-03-01 PROCEDURE — 99215 OFFICE O/P EST HI 40 MIN: CPT | Performed by: PSYCHIATRY & NEUROLOGY

## 2019-03-01 PROCEDURE — G8427 DOCREV CUR MEDS BY ELIG CLIN: HCPCS | Performed by: PSYCHIATRY & NEUROLOGY

## 2019-03-01 PROCEDURE — 1036F TOBACCO NON-USER: CPT | Performed by: PSYCHIATRY & NEUROLOGY

## 2019-03-01 PROCEDURE — G8419 CALC BMI OUT NRM PARAM NOF/U: HCPCS | Performed by: PSYCHIATRY & NEUROLOGY

## 2019-03-01 PROCEDURE — G8484 FLU IMMUNIZE NO ADMIN: HCPCS | Performed by: PSYCHIATRY & NEUROLOGY

## 2019-03-01 RX ORDER — AMITRIPTYLINE HYDROCHLORIDE 25 MG/1
25 TABLET, FILM COATED ORAL NIGHTLY
Qty: 30 TABLET | Refills: 2 | Status: SHIPPED | OUTPATIENT
Start: 2019-03-01 | End: 2019-05-25 | Stop reason: SDUPTHER

## 2019-03-01 ASSESSMENT — ENCOUNTER SYMPTOMS
EYES NEGATIVE: 1
ALLERGIC/IMMUNOLOGIC NEGATIVE: 1
RESPIRATORY NEGATIVE: 1
GASTROINTESTINAL NEGATIVE: 1

## 2019-05-07 ENCOUNTER — OFFICE VISIT (OUTPATIENT)
Dept: PRIMARY CARE CLINIC | Age: 49
End: 2019-05-07
Payer: COMMERCIAL

## 2019-05-07 VITALS
WEIGHT: 117.2 LBS | OXYGEN SATURATION: 96 % | HEART RATE: 84 BPM | BODY MASS INDEX: 19.53 KG/M2 | DIASTOLIC BLOOD PRESSURE: 74 MMHG | SYSTOLIC BLOOD PRESSURE: 128 MMHG | HEIGHT: 65 IN

## 2019-05-07 DIAGNOSIS — F41.9 ANXIETY DISORDER, UNSPECIFIED TYPE: Primary | ICD-10-CM

## 2019-05-07 PROCEDURE — 1036F TOBACCO NON-USER: CPT | Performed by: FAMILY MEDICINE

## 2019-05-07 PROCEDURE — 99213 OFFICE O/P EST LOW 20 MIN: CPT | Performed by: FAMILY MEDICINE

## 2019-05-07 PROCEDURE — G8420 CALC BMI NORM PARAMETERS: HCPCS | Performed by: FAMILY MEDICINE

## 2019-05-07 PROCEDURE — G8427 DOCREV CUR MEDS BY ELIG CLIN: HCPCS | Performed by: FAMILY MEDICINE

## 2019-05-07 RX ORDER — DULOXETIN HYDROCHLORIDE 20 MG/1
20 CAPSULE, DELAYED RELEASE ORAL DAILY
Qty: 30 CAPSULE | Refills: 5 | Status: SHIPPED | OUTPATIENT
Start: 2019-05-07 | End: 2019-10-31 | Stop reason: SDUPTHER

## 2019-05-07 ASSESSMENT — ENCOUNTER SYMPTOMS
RHINORRHEA: 0
WHEEZING: 0
VOMITING: 0
EYE DISCHARGE: 0
DIARRHEA: 0
SHORTNESS OF BREATH: 1
NAUSEA: 1
EYE REDNESS: 0
SORE THROAT: 0
ABDOMINAL PAIN: 0
COUGH: 0

## 2019-05-07 ASSESSMENT — PATIENT HEALTH QUESTIONNAIRE - PHQ9
1. LITTLE INTEREST OR PLEASURE IN DOING THINGS: 1
SUM OF ALL RESPONSES TO PHQ QUESTIONS 1-9: 2
SUM OF ALL RESPONSES TO PHQ QUESTIONS 1-9: 2
2. FEELING DOWN, DEPRESSED OR HOPELESS: 1
SUM OF ALL RESPONSES TO PHQ9 QUESTIONS 1 & 2: 2

## 2019-05-07 NOTE — PROGRESS NOTES
[address] [facility] PRIMARY CARE  [id number] [address] [facility]  Dept: [phone]    Tanya Javier is a 57 y.o. female who presents today for her medical conditions/complaintsas noted below. Chief Complaint   Patient presents with    New Patient     Get established       HPI:     HPI   Pt presents today to get established as a new patient. Pt not currently taking any medications. Pt had symptoms of numbness, tingling, palpitations, and sob around December or January. Pt visited [facility] after continuing symptoms and neurologist told her it was anxiety related. She denies any other current medical problems. Pt was started on elavil for anxiety. Patient had been given Neurontin From [facility].          LDL Cholesterol (mg/dL)   Date Value   02/19/2019 50       (goal LDL is <100)   AST (U/L)   Date Value   02/18/2019 15     ALT (U/L)   Date Value   02/18/2019 10     BUN (mg/dL)   Date Value   02/19/2019 15     BP Readings from Last 3 Encounters:   05/07/19 128/74   03/01/19 108/79   02/19/19 (!) 90/57          (goal 120/80)    Past Medical History:   Diagnosis Date    NSTEMI (non-ST elevated myocardial infarction) ([facility] [address] 01/28/2019    Pulmonary emboli (HCC)     when 26 YO      Past Surgical History:   Procedure Laterality Date    CARDIAC CATHETERIZATION  02/2019    CHOLECYSTECTOMY      VENA CAVA FILTER PLACEMENT      VENA CAVA FILTER PLACEMENT         Family History   Problem Relation Age of Onset    Breast Cancer Maternal Aunt     Cancer Paternal Uncle     Cancer Maternal Grandfather     Cancer Paternal Grandmother        Social History     Tobacco Use    Smoking status: Never Smoker    Smokeless tobacco: Never Used   Substance Use Topics    Alcohol use: Yes     Comment: social      Current Outpatient Medications   Medication Sig Dispense Refill    DULoxetine (CYMBALTA) 20 MG extended release capsule Take 1 capsule by mouth daily 30 capsule 5    amitriptyline (ELAVIL) 25 MG tablet Take 1 tablet by mouth nightly 30 tablet 2    ALPRAZolam (XANAX) 0.25 MG tablet Take 0.25 mg by mouth nightly as needed for Sleep. .       No current facility-administered medications for this visit. Allergies   Allergen Reactions    Lamisil [Terbinafine Hcl]     Nubain [Nalbuphine Hcl]        Health Maintenance   Topic Date Due    HIV screen  07/30/1987    DTaP/Tdap/Td vaccine (1 - Tdap) 07/30/1991    Cervical cancer screen  07/30/1993    Flu vaccine (Season Ended) 09/01/2019    Lipid screen  02/19/2024    Pneumococcal 0-64 years Vaccine  Aged Out       Subjective:      Review of Systems   Constitutional: Negative for chills and fever. HENT: Negative for rhinorrhea and sore throat. Eyes: Negative for discharge and redness. Respiratory: Positive for shortness of breath. Negative for cough and wheezing. Cardiovascular: Positive for palpitations. Negative for chest pain. Gastrointestinal: Positive for nausea. Negative for abdominal pain, diarrhea and vomiting. Genitourinary: Negative for dysuria and frequency. Musculoskeletal: Negative for arthralgias and myalgias. Neurological: Negative for dizziness, light-headedness and headaches. Psychiatric/Behavioral: Positive for sleep disturbance. Objective:     /74   Pulse 84   Ht 5' 5.04\" (1.652 m)   Wt 117 lb 3.2 oz (53.2 kg)   SpO2 96%   BMI 19.48 kg/m²   Physical Exam   Constitutional: She is oriented to person, place, and time. She appears well-developed and well-nourished. No distress. HENT:   Head: Normocephalic and atraumatic. Mouth/Throat: Oropharynx is clear and moist.   Eyes: Pupils are equal, round, and reactive to light. Conjunctivae are normal. Right eye exhibits no discharge. Left eye exhibits no discharge. No scleral icterus. Neck: No tracheal deviation present. No thyromegaly present. Cardiovascular: Normal rate, regular rhythm and normal heart sounds.

## 2019-05-28 RX ORDER — AMITRIPTYLINE HYDROCHLORIDE 25 MG/1
25 TABLET, FILM COATED ORAL NIGHTLY
Qty: 30 TABLET | Refills: 0 | Status: SHIPPED | OUTPATIENT
Start: 2019-05-28 | End: 2020-11-10

## 2019-10-31 RX ORDER — DULOXETIN HYDROCHLORIDE 20 MG/1
CAPSULE, DELAYED RELEASE ORAL
Qty: 30 CAPSULE | Refills: 5 | Status: SHIPPED | OUTPATIENT
Start: 2019-10-31 | End: 2020-11-10 | Stop reason: SDUPTHER

## 2019-12-17 ENCOUNTER — TELEPHONE (OUTPATIENT)
Dept: PRIMARY CARE CLINIC | Age: 49
End: 2019-12-17

## 2019-12-23 ENCOUNTER — TELEPHONE (OUTPATIENT)
Dept: PRIMARY CARE CLINIC | Age: 49
End: 2019-12-23

## 2020-11-10 ENCOUNTER — OFFICE VISIT (OUTPATIENT)
Dept: PRIMARY CARE CLINIC | Age: 50
End: 2020-11-10
Payer: COMMERCIAL

## 2020-11-10 VITALS
BODY MASS INDEX: 20.41 KG/M2 | OXYGEN SATURATION: 98 % | WEIGHT: 122.8 LBS | DIASTOLIC BLOOD PRESSURE: 68 MMHG | TEMPERATURE: 97.2 F | SYSTOLIC BLOOD PRESSURE: 118 MMHG | HEART RATE: 78 BPM

## 2020-11-10 PROBLEM — I26.99 PE (PULMONARY THROMBOEMBOLISM) (HCC): Status: ACTIVE | Noted: 2020-11-10

## 2020-11-10 PROCEDURE — 99213 OFFICE O/P EST LOW 20 MIN: CPT | Performed by: NURSE PRACTITIONER

## 2020-11-10 RX ORDER — ALPRAZOLAM 0.25 MG/1
0.25 TABLET ORAL NIGHTLY PRN
Qty: 30 TABLET | Refills: 0 | Status: SHIPPED | OUTPATIENT
Start: 2020-11-10 | End: 2020-12-10

## 2020-11-10 RX ORDER — PREDNISONE 20 MG/1
40 TABLET ORAL DAILY
Qty: 10 TABLET | Refills: 0 | Status: SHIPPED | OUTPATIENT
Start: 2020-11-10 | End: 2020-11-15

## 2020-11-10 RX ORDER — DULOXETIN HYDROCHLORIDE 20 MG/1
CAPSULE, DELAYED RELEASE ORAL
Qty: 30 CAPSULE | Refills: 5 | Status: SHIPPED | OUTPATIENT
Start: 2020-11-10 | End: 2022-01-11

## 2020-11-10 ASSESSMENT — ENCOUNTER SYMPTOMS
SINUS PRESSURE: 0
DIARRHEA: 0
COUGH: 0
SHORTNESS OF BREATH: 0
NAUSEA: 0
CONSTIPATION: 0
RHINORRHEA: 0
CHEST TIGHTNESS: 0
SINUS PAIN: 0

## 2020-11-10 ASSESSMENT — PATIENT HEALTH QUESTIONNAIRE - PHQ9
SUM OF ALL RESPONSES TO PHQ QUESTIONS 1-9: 0
SUM OF ALL RESPONSES TO PHQ9 QUESTIONS 1 & 2: 0
SUM OF ALL RESPONSES TO PHQ QUESTIONS 1-9: 0
SUM OF ALL RESPONSES TO PHQ QUESTIONS 1-9: 0
2. FEELING DOWN, DEPRESSED OR HOPELESS: 0
1. LITTLE INTEREST OR PLEASURE IN DOING THINGS: 0

## 2020-11-10 NOTE — PATIENT INSTRUCTIONS
Patient Education        Epley Maneuver at Home for Vertigo: Exercises  Introduction  Vertigo is a spinning or whirling sensation when you move your head. Your doctor may have moved you in different positions to help your vertigo get better faster. This is called the Epley maneuver. Your doctor also may have asked you to do these exercises at home. Do the exercises as often as your doctor recommends. If your vertigo is getting worse, your doctor may have you change the exercise or stop it. Step 1  Step 1   1. Sit on the edge of a bed or sofa. Step 2   1. Turn your head 45 degrees in the direction your doctor told you to. This should be toward the ear that causes the most vertigo for you. In this picture, the woman is turning toward her left ear. Step 3   1. Tilt yourself backward until you are lying on your back. Your head should still be at a 45-degree turn. Your head should be about midway between looking straight ahead and looking out to your side. Hold for 30 seconds. If you have vertigo, stay in this position until it stops. Step 4   1. Turn your head 90 degrees toward the ear that has the least vertigo. In this picture, the woman is turning to the right because she has vertigo on her left side. The point of your chin should be raised and over your shoulder. Hold for 30 seconds. Step 5   1. Roll onto the side with the least vertigo. You should now be looking at the floor. Hold for 30 seconds. Follow-up care is a key part of your treatment and safety. Be sure to make and go to all appointments, and call your doctor if you are having problems. It's also a good idea to know your test results and keep a list of the medicines you take. Where can you learn more? Go to https://chsaraieb.SOAMAI. org and sign in to your [employer] account. Enter W972 in the [employer] box to learn more about \"Epley Maneuver at Home for Vertigo: Exercises. \"     If you do not have an account, please click on the \"Sign Up Now\" link. Current as of: November 20, 2019               Content Version: 12.6  © 2006-2020 [employer]. Care instructions adapted under license by [facility] ([facility]). If you have questions about a medical condition or this instruction, always ask your healthcare professional. [address] any warranty or liability for your use of this information. Patient Education        Epley Maneuver for Vertigo: Exercises  Introduction  The Epley maneuver is a series of movements your doctor may use to treat your vertigo. Here are the steps for the exercises. Your doctor or physical therapist will guide you through the movements. A single 10- to 15-minute session often is all that's needed. Crystal debris (canaliths) cause the vertigo. When your head is moved into different positions, the debris moves freely. This may cause your symptoms to stop. How to do the exercises  Step 1   1. You will sit on the doctor's exam table. Your legs will be out in front of you. The doctor or physical therapist will turn your head so that it is retirement between looking straight ahead and looking to the side that causes the worst vertigo. 2. Without changing your head position, he or she will guide you back quickly. Your shoulders will be on the table. Your head will hang over the edge of the table. At this point, the side of your head that is causing the worst vertigo will face the floor. You'll stay in this position for 30 seconds or until your symptoms stop. Step 2   1. Then, the doctor or physical therapist will turn your head to the other side. You don't need to lift your head. The other side of your head will face the floor. You will stay in this position for 30 seconds or until your symptoms stop. Step 3   1. The doctor or physical therapist will help you roll your body in the same direction that your head is facing. You will lie on your side.  (For example, if you are looking to your right, you will roll onto your right side.) The side that causes the worst symptoms should be facing up. You'll stay in this position for another 30 seconds or until your symptoms stop. Step 4   1. The doctor or physical therapist will then help you to sit back up. Your legs will hang off the table on the same side that you were facing. Follow-up care is a key part of your treatment and safety. Be sure to make and go to all appointments, and call your doctor if you are having problems. It's also a good idea to know your test results and keep a list of the medicines you take. Where can you learn more? Go to https://[employer].EQAL. org and sign in to your [employer] account. Enter P630 in the [employer] box to learn more about \"Epley Maneuver for Vertigo: Exercises. \"     If you do not have an account, please click on the \"Sign Up Now\" link. Current as of: April 15, 2020               Content Version: 12.6  © 2006-2020 "[employer]. Care instructions adapted under license by [facility] ([facility]). If you have questions about a medical condition or this instruction, always ask your healthcare professional. [place] [address] any warranty or liability for your use of this information.

## 2020-11-10 NOTE — PROGRESS NOTES
[address] [facility] PRIMARY CARE  [address] [facility] [address]  Dept: [phone]    Matias Wylie is a 52 y.o. female who presents today for her medical conditions/complaintsas noted below. Chief Complaint   Patient presents with    Medication Check       HPI:     HPI   Pt to office for med refill but has not been seen in over a year, pt states she is having some pain in her left ear since February, decreased hearing, pressure present, dizziness at night with moving her head, pt also reports intermittent tingling in her extremities for the past 2 years, pt states it only last for a few seconds, sometimes in her arm, sometimes in her legs, down her buttock, pt had a CT to r/o MS which was negative.      LDL Cholesterol (mg/dL)   Date Value   02/19/2019 50       (goal LDL is <100)   AST (U/L)   Date Value   02/18/2019 15     ALT (U/L)   Date Value   02/18/2019 10     BUN (mg/dL)   Date Value   02/19/2019 15     BP Readings from Last 3 Encounters:   11/10/20 118/68   05/07/19 128/74   03/01/19 108/79          (goal 120/80)    Past Medical History:   Diagnosis Date    NSTEMI (non-ST elevated myocardial infarction) ([address] 01/28/2019    Pulmonary emboli (HCC)     when 24 YO      Past Surgical History:   Procedure Laterality Date    CARDIAC CATHETERIZATION  02/2019    CHOLECYSTECTOMY      VENA CAVA FILTER PLACEMENT      VENA CAVA FILTER PLACEMENT         Family History   Problem Relation Age of Onset    Breast Cancer Maternal Aunt     Cancer Paternal Uncle     Cancer Maternal Grandfather     Cancer Paternal Grandmother        Social History     Tobacco Use    Smoking status: Never Smoker    Smokeless tobacco: Never Used   Substance Use Topics    Alcohol use: Yes     Comment: social      Current Outpatient Medications   Medication Sig Dispense Refill    ALPRAZolam (XANAX) 0.25 MG tablet Take 1 tablet by mouth nightly as needed for Sleep or Anxiety for up to 30 days. 30 tablet 0    DULoxetine (CYMBALTA) 20 MG extended release capsule take 1 capsule by mouth once daily 30 capsule 5    predniSONE (DELTASONE) 20 MG tablet Take 2 tablets by mouth daily for 5 days 10 tablet 0     No current facility-administered medications for this visit. Allergies   Allergen Reactions    Lamisil [Terbinafine Hcl]     Nubain [Nalbuphine Hcl]     Other      MRI contrast= severe feeling of burning sensation, tingling,  Numbness + nodlues       Health Maintenance   Topic Date Due    HIV screen  07/30/1987    DTaP/Tdap/Td vaccine (1 - Tdap) 07/30/1991    Cervical cancer screen  07/30/1993    Lipid screen  02/19/2024    Flu vaccine  Completed    Hepatitis A vaccine  Aged Out    Hepatitis B vaccine  Aged Out    Hib vaccine  Aged Out    Meningococcal (ACWY) vaccine  Aged Out    Pneumococcal 0-64 years Vaccine  Aged Out       Subjective:      Review of Systems   Constitutional: Negative for activity change, appetite change, chills, diaphoresis and fatigue. HENT: Positive for ear pain (left ear intermittent since February). Negative for ear discharge, rhinorrhea, sinus pressure, sinus pain and tinnitus. Respiratory: Negative for cough, chest tightness and shortness of breath. Gastrointestinal: Negative for constipation, diarrhea and nausea. Genitourinary: Negative for dysuria, flank pain and frequency. Skin: Negative for pallor and rash. Neurological: Negative for dizziness, numbness and headaches. Objective:     /68   Pulse 78   Temp 97.2 °F (36.2 °C)   Wt 122 lb 12.8 oz (55.7 kg)   LMP 11/01/2020 (Exact Date)   SpO2 98%   BMI 20.41 kg/m²   Physical Exam  Constitutional:       Appearance: Normal appearance. She is normal weight. HENT:      Head: Normocephalic and atraumatic.       Right Ear: Tympanic membrane, ear canal and external ear normal.      Left Ear: Tympanic membrane, ear canal and external ear normal.   Eyes:      Extraocular Movements: tablet by mouth nightly as needed for Sleep or Anxiety for up to 30 days. Dispense:  30 tablet     Refill:  0    DULoxetine (CYMBALTA) 20 MG extended release capsule     Sig: take 1 capsule by mouth once daily     Dispense:  30 capsule     Refill:  5    predniSONE (DELTASONE) 20 MG tablet     Sig: Take 2 tablets by mouth daily for 5 days     Dispense:  10 tablet     Refill:  0       Patient given educationalmaterials - see patient instructions. Discussed use, benefit, and side effectsof prescribed medications. All patient questions answered. Pt voiced understanding. Reviewed health maintenance. Instructed to continue current medications, diet andexercise. Patient agreed with treatment plan. Follow up as directed.      Electronicallysigned by JANAE Hollis CNP on 11/10/2020 at 12:22 PM

## 2021-08-25 ENCOUNTER — HOSPITAL ENCOUNTER (OUTPATIENT)
Age: 51
Discharge: HOME | End: 2021-08-25
Payer: COMMERCIAL

## 2021-08-25 LAB
ABSOLUTE EOS #: 0.14 K/UL (ref 0–0.44)
ABSOLUTE IMMATURE GRANULOCYTE: <0.03 K/UL (ref 0–0.3)
ABSOLUTE LYMPH #: 2.08 K/UL (ref 1.1–3.7)
ABSOLUTE MONO #: 0.61 K/UL (ref 0.1–1.2)
BASOPHILS # BLD: 1 % (ref 0–2)
BASOPHILS ABSOLUTE: 0.04 K/UL (ref 0–0.2)
DIFFERENTIAL TYPE: ABNORMAL
EOSINOPHILS RELATIVE PERCENT: 2 % (ref 1–4)
HCT VFR BLD CALC: 37.3 % (ref 36.3–47.1)
HEMOGLOBIN: 12.4 G/DL (ref 11.9–15.1)
IMMATURE GRANULOCYTES: 0 %
LYMPHOCYTES # BLD: 29 % (ref 24–43)
MCH RBC QN AUTO: 32.2 PG (ref 25.2–33.5)
MCHC RBC AUTO-ENTMCNC: 33.2 G/DL (ref 28.4–34.8)
MCV RBC AUTO: 96.9 FL (ref 82.6–102.9)
MONOCYTES # BLD: 9 % (ref 3–12)
NRBC AUTOMATED: 0 PER 100 WBC
PDW BLD-RTO: 11.9 % (ref 11.8–14.4)
PLATELET # BLD: 348 K/UL (ref 138–453)
PLATELET ESTIMATE: ABNORMAL
PMV BLD AUTO: 8.9 FL (ref 8.1–13.5)
PROLACTIN: 21.32 UG/L (ref 4.79–23.3)
RBC # BLD: 3.85 M/UL (ref 3.95–5.11)
RBC # BLD: ABNORMAL 10*6/UL
SEG NEUTROPHILS: 59 % (ref 36–65)
SEGMENTED NEUTROPHILS ABSOLUTE COUNT: 4.19 K/UL (ref 1.5–8.1)
WBC # BLD: 7.1 K/UL (ref 3.5–11.3)
WBC # BLD: ABNORMAL 10*3/UL

## 2021-08-25 PROCEDURE — 85025 COMPLETE CBC W/AUTO DIFF WBC: CPT

## 2021-08-25 PROCEDURE — 36415 COLL VENOUS BLD VENIPUNCTURE: CPT

## 2021-08-25 PROCEDURE — 84146 ASSAY OF PROLACTIN: CPT

## 2021-09-21 ENCOUNTER — TELEPHONE (OUTPATIENT)
Dept: PRIMARY CARE CLINIC | Age: 51
End: 2021-09-21

## 2021-10-15 ENCOUNTER — TELEPHONE (OUTPATIENT)
Dept: PRIMARY CARE CLINIC | Age: 51
End: 2021-10-15

## 2021-10-15 NOTE — TELEPHONE ENCOUNTER
----- Message from Lydia Bates sent at 10/13/2021  1:47 PM EDT -----  Subject: Message to Provider    QUESTIONS  Information for Provider? Patient states she is supposed to have a [address] on   10/28, but her surgeon is requesting a surgical clearance from Dr. Laya Aponte. Pt states she has already had all lab work done, had an EKG done, and has   seen hematology. Pt states she is not sure why they are requiring her to   have a surgical clearance as she has had this done before and did not need   one. Pt states surgeon stated Dr. Laya Aponte could just sign the paper w/o the   pt coming in the office. Pt would like a call back regarding if this can   be done please.  ---------------------------------------------------------------------------  --------------  CALL BACK INFO  What is the best way for the office to contact you? OK to leave message on   voicemail  Preferred Call Back Phone Number? [phone]  ---------------------------------------------------------------------------  --------------  SCRIPT ANSWERS  Relationship to Patient?  Self

## 2021-10-25 ENCOUNTER — HOSPITAL ENCOUNTER (OUTPATIENT)
Age: 51
Discharge: HOME | End: 2021-10-25
Payer: COMMERCIAL

## 2021-10-25 LAB — HCG QUALITATIVE: NEGATIVE

## 2021-10-25 PROCEDURE — 36415 COLL VENOUS BLD VENIPUNCTURE: CPT

## 2021-10-25 PROCEDURE — 84703 CHORIONIC GONADOTROPIN ASSAY: CPT

## 2021-12-02 ENCOUNTER — HOSPITAL ENCOUNTER (OUTPATIENT)
Age: 51
Discharge: HOME | End: 2021-12-02
Payer: COMMERCIAL

## 2021-12-02 LAB — HCG QUALITATIVE: NEGATIVE

## 2021-12-02 PROCEDURE — 84703 CHORIONIC GONADOTROPIN ASSAY: CPT

## 2021-12-02 PROCEDURE — 36415 COLL VENOUS BLD VENIPUNCTURE: CPT

## 2021-12-31 ENCOUNTER — HOSPITAL ENCOUNTER (OUTPATIENT)
Age: 51
Setting detail: OBSERVATION
Discharge: HOME | End: 2022-01-01
Attending: EMERGENCY MEDICINE | Admitting: EMERGENCY MEDICINE
Payer: COMMERCIAL

## 2021-12-31 DIAGNOSIS — I82.402 ACUTE DEEP VEIN THROMBOSIS (DVT) OF LEFT LOWER EXTREMITY, UNSPECIFIED VEIN (HCC): Primary | ICD-10-CM

## 2021-12-31 PROBLEM — I82.412 ACUTE DEEP VEIN THROMBOSIS (DVT) OF LEFT FEMORAL VEIN (HCC): Status: ACTIVE | Noted: 2021-12-31

## 2021-12-31 LAB
ABSOLUTE EOS #: 0.33 K/UL (ref 0–0.44)
ABSOLUTE IMMATURE GRANULOCYTE: <0.03 K/UL (ref 0–0.3)
ABSOLUTE LYMPH #: 2.06 K/UL (ref 1.1–3.7)
ABSOLUTE MONO #: 0.75 K/UL (ref 0.1–1.2)
ALBUMIN SERPL-MCNC: 4.7 G/DL (ref 3.5–5.2)
ALBUMIN/GLOBULIN RATIO: 1.4 (ref 1–2.5)
ALP BLD-CCNC: 93 U/L (ref 35–104)
ALT SERPL-CCNC: 15 U/L (ref 5–33)
ANION GAP SERPL CALCULATED.3IONS-SCNC: 12 MMOL/L (ref 9–17)
AST SERPL-CCNC: 16 U/L
BASOPHILS # BLD: 1 % (ref 0–2)
BASOPHILS ABSOLUTE: 0.05 K/UL (ref 0–0.2)
BILIRUB SERPL-MCNC: 0.25 MG/DL (ref 0.3–1.2)
BUN BLDV-MCNC: 15 MG/DL (ref 6–20)
BUN/CREAT BLD: ABNORMAL (ref 9–20)
CALCIUM SERPL-MCNC: 9.2 MG/DL (ref 8.6–10.4)
CHLORIDE BLD-SCNC: 102 MMOL/L (ref 98–107)
CO2: 22 MMOL/L (ref 20–31)
CREAT SERPL-MCNC: 0.64 MG/DL (ref 0.5–0.9)
DIFFERENTIAL TYPE: ABNORMAL
EOSINOPHILS RELATIVE PERCENT: 4 % (ref 1–4)
GFR AFRICAN AMERICAN: >60 ML/MIN
GFR NON-AFRICAN AMERICAN: >60 ML/MIN
GFR SERPL CREATININE-BSD FRML MDRD: ABNORMAL ML/MIN/{1.73_M2}
GFR SERPL CREATININE-BSD FRML MDRD: ABNORMAL ML/MIN/{1.73_M2}
GLUCOSE BLD-MCNC: 96 MG/DL (ref 70–99)
HCT VFR BLD CALC: 36.5 % (ref 36.3–47.1)
HEMOGLOBIN: 12.8 G/DL (ref 11.9–15.1)
IMMATURE GRANULOCYTES: 0 %
INR BLD: 0.9
LYMPHOCYTES # BLD: 26 % (ref 24–43)
MCH RBC QN AUTO: 33.2 PG (ref 25.2–33.5)
MCHC RBC AUTO-ENTMCNC: 35.1 G/DL (ref 28.4–34.8)
MCV RBC AUTO: 94.8 FL (ref 82.6–102.9)
MONOCYTES # BLD: 10 % (ref 3–12)
NRBC AUTOMATED: 0 PER 100 WBC
PARTIAL THROMBOPLASTIN TIME: 32.5 SEC (ref 20.5–30.5)
PDW BLD-RTO: 11.7 % (ref 11.8–14.4)
PLATELET # BLD: 379 K/UL (ref 138–453)
PLATELET ESTIMATE: ABNORMAL
PMV BLD AUTO: 8.8 FL (ref 8.1–13.5)
POTASSIUM SERPL-SCNC: 4 MMOL/L (ref 3.7–5.3)
PROTHROMBIN TIME: 10.2 SEC (ref 9.1–12.3)
RBC # BLD: 3.85 M/UL (ref 3.95–5.11)
RBC # BLD: ABNORMAL 10*6/UL
SARS-COV-2, RAPID: NOT DETECTED
SEG NEUTROPHILS: 59 % (ref 36–65)
SEGMENTED NEUTROPHILS ABSOLUTE COUNT: 4.72 K/UL (ref 1.5–8.1)
SODIUM BLD-SCNC: 136 MMOL/L (ref 135–144)
SPECIMEN DESCRIPTION: NORMAL
TOTAL PROTEIN: 8.1 G/DL (ref 6.4–8.3)
WBC # BLD: 7.9 K/UL (ref 3.5–11.3)
WBC # BLD: ABNORMAL 10*3/UL

## 2021-12-31 PROCEDURE — 6370000000 HC RX 637 (ALT 250 FOR IP): Performed by: STUDENT IN AN ORGANIZED HEALTH CARE EDUCATION/TRAINING PROGRAM

## 2021-12-31 PROCEDURE — 85610 PROTHROMBIN TIME: CPT

## 2021-12-31 PROCEDURE — 85025 COMPLETE CBC W/AUTO DIFF WBC: CPT

## 2021-12-31 PROCEDURE — 85730 THROMBOPLASTIN TIME PARTIAL: CPT

## 2021-12-31 PROCEDURE — G0378 HOSPITAL OBSERVATION PER HR: HCPCS

## 2021-12-31 PROCEDURE — 80053 COMPREHEN METABOLIC PANEL: CPT

## 2021-12-31 PROCEDURE — 96375 TX/PRO/DX INJ NEW DRUG ADDON: CPT

## 2021-12-31 PROCEDURE — 96374 THER/PROPH/DIAG INJ IV PUSH: CPT

## 2021-12-31 PROCEDURE — 6360000002 HC RX W HCPCS: Performed by: STUDENT IN AN ORGANIZED HEALTH CARE EDUCATION/TRAINING PROGRAM

## 2021-12-31 PROCEDURE — 93970 EXTREMITY STUDY: CPT

## 2021-12-31 PROCEDURE — 87635 SARS-COV-2 COVID-19 AMP PRB: CPT

## 2021-12-31 PROCEDURE — 99284 EMERGENCY DEPT VISIT MOD MDM: CPT

## 2021-12-31 RX ORDER — HYDROCODONE BITARTRATE AND ACETAMINOPHEN 5; 325 MG/1; MG/1
2 TABLET ORAL EVERY 4 HOURS PRN
Status: DISCONTINUED | OUTPATIENT
Start: 2021-12-31 | End: 2022-01-01 | Stop reason: HOSPADM

## 2021-12-31 RX ORDER — SODIUM CHLORIDE 0.9 % (FLUSH) 0.9 %
5-40 SYRINGE (ML) INJECTION EVERY 12 HOURS SCHEDULED
Status: DISCONTINUED | OUTPATIENT
Start: 2021-12-31 | End: 2022-01-01 | Stop reason: HOSPADM

## 2021-12-31 RX ORDER — FENTANYL CITRATE 50 UG/ML
25 INJECTION, SOLUTION INTRAMUSCULAR; INTRAVENOUS
Status: DISCONTINUED | OUTPATIENT
Start: 2021-12-31 | End: 2022-01-01 | Stop reason: HOSPADM

## 2021-12-31 RX ORDER — SODIUM CHLORIDE 9 MG/ML
25 INJECTION, SOLUTION INTRAVENOUS PRN
Status: DISCONTINUED | OUTPATIENT
Start: 2021-12-31 | End: 2022-01-01 | Stop reason: HOSPADM

## 2021-12-31 RX ORDER — FENTANYL CITRATE 50 UG/ML
50 INJECTION, SOLUTION INTRAMUSCULAR; INTRAVENOUS ONCE
Status: COMPLETED | OUTPATIENT
Start: 2021-12-31 | End: 2021-12-31

## 2021-12-31 RX ORDER — KETOROLAC TROMETHAMINE 30 MG/ML
30 INJECTION, SOLUTION INTRAMUSCULAR; INTRAVENOUS ONCE
Status: COMPLETED | OUTPATIENT
Start: 2021-12-31 | End: 2021-12-31

## 2021-12-31 RX ORDER — SODIUM CHLORIDE 0.9 % (FLUSH) 0.9 %
5-40 SYRINGE (ML) INJECTION PRN
Status: DISCONTINUED | OUTPATIENT
Start: 2021-12-31 | End: 2022-01-01 | Stop reason: HOSPADM

## 2021-12-31 RX ORDER — DULOXETIN HYDROCHLORIDE 20 MG/1
20 CAPSULE, DELAYED RELEASE ORAL DAILY
Status: DISCONTINUED | OUTPATIENT
Start: 2021-12-31 | End: 2022-01-01 | Stop reason: HOSPADM

## 2021-12-31 RX ORDER — ONDANSETRON 2 MG/ML
4 INJECTION INTRAMUSCULAR; INTRAVENOUS EVERY 6 HOURS PRN
Status: DISCONTINUED | OUTPATIENT
Start: 2021-12-31 | End: 2022-01-01 | Stop reason: HOSPADM

## 2021-12-31 RX ORDER — ONDANSETRON 4 MG/1
4 TABLET, ORALLY DISINTEGRATING ORAL EVERY 8 HOURS PRN
Status: DISCONTINUED | OUTPATIENT
Start: 2021-12-31 | End: 2022-01-01 | Stop reason: HOSPADM

## 2021-12-31 RX ORDER — FENTANYL CITRATE 50 UG/ML
50 INJECTION, SOLUTION INTRAMUSCULAR; INTRAVENOUS
Status: DISCONTINUED | OUTPATIENT
Start: 2021-12-31 | End: 2022-01-01 | Stop reason: HOSPADM

## 2021-12-31 RX ORDER — HYDROCODONE BITARTRATE AND ACETAMINOPHEN 5; 325 MG/1; MG/1
1 TABLET ORAL EVERY 4 HOURS PRN
Status: DISCONTINUED | OUTPATIENT
Start: 2021-12-31 | End: 2022-01-01 | Stop reason: HOSPADM

## 2021-12-31 RX ORDER — ACETAMINOPHEN 325 MG/1
650 TABLET ORAL EVERY 4 HOURS PRN
Status: DISCONTINUED | OUTPATIENT
Start: 2021-12-31 | End: 2022-01-01 | Stop reason: HOSPADM

## 2021-12-31 RX ADMIN — FENTANYL CITRATE 50 MCG: 50 INJECTION INTRAMUSCULAR; INTRAVENOUS at 17:20

## 2021-12-31 RX ADMIN — HYDROCODONE BITARTRATE AND ACETAMINOPHEN 1 TABLET: 5; 325 TABLET ORAL at 21:37

## 2021-12-31 RX ADMIN — KETOROLAC TROMETHAMINE 30 MG: 30 INJECTION, SOLUTION INTRAMUSCULAR at 15:54

## 2021-12-31 RX ADMIN — APIXABAN 10 MG: 5 TABLET, FILM COATED ORAL at 21:37

## 2021-12-31 ASSESSMENT — PAIN DESCRIPTION - ORIENTATION: ORIENTATION: LEFT

## 2021-12-31 ASSESSMENT — PAIN DESCRIPTION - ONSET: ONSET: ON-GOING

## 2021-12-31 ASSESSMENT — PAIN SCALES - GENERAL
PAINLEVEL_OUTOF10: 8
PAINLEVEL_OUTOF10: 4
PAINLEVEL_OUTOF10: 7
PAINLEVEL_OUTOF10: 6
PAINLEVEL_OUTOF10: 8

## 2021-12-31 ASSESSMENT — PAIN DESCRIPTION - LOCATION: LOCATION: KNEE;LEG

## 2021-12-31 ASSESSMENT — PAIN - FUNCTIONAL ASSESSMENT: PAIN_FUNCTIONAL_ASSESSMENT: ACTIVITIES ARE NOT PREVENTED

## 2021-12-31 ASSESSMENT — PAIN DESCRIPTION - PROGRESSION: CLINICAL_PROGRESSION: NOT CHANGED

## 2021-12-31 ASSESSMENT — PAIN DESCRIPTION - DESCRIPTORS: DESCRIPTORS: ACHING;DISCOMFORT

## 2021-12-31 ASSESSMENT — PAIN DESCRIPTION - PAIN TYPE: TYPE: ACUTE PAIN

## 2021-12-31 ASSESSMENT — PAIN DESCRIPTION - FREQUENCY: FREQUENCY: CONTINUOUS

## 2021-12-31 NOTE — ED PROVIDER NOTES
[facility] [address] [facility]  Emergency Department  Emergency Medicine Resident Sign-out     Care of Willard Fontenot was assumed from Dr. Sandra Chamberlain and is being seen for Other (blood clot to the left calf, diagnosed yesterday, started on elowuis. pain is traveling up the knee into the thigh, uncontrolled by tylenol. pt has histrory of PE. recent Hyst )  . The patient's initial evaluation and plan have been discussed with the prior provider who initially evaluated the patient. EMERGENCY DEPARTMENT COURSE / MEDICAL DECISION MAKING:       MEDICATIONS GIVEN:  Orders Placed This Encounter   Medications    ketorolac (TORADOL) injection 30 mg    fentaNYL (SUBLIMAZE) injection 50 mcg       LABS / RADIOLOGY:     Labs Reviewed   CBC WITH AUTO DIFFERENTIAL - Abnormal; Notable for the following components:       Result Value    RBC 3.85 (*)     MCHC 35.1 (*)     RDW 11.7 (*)     All other components within normal limits   COMPREHENSIVE METABOLIC PANEL - Abnormal; Notable for the following components:     Total Bilirubin 0.25 (*)     All other components within normal limits   APTT - Abnormal; Notable for the following components:    PTT 32.5 (*)     All other components within normal limits   COVID-19, RAPID   PROTIME-INR       VL DUP LOWER EXTREMITY VENOUS BILATERAL    Result Date: 12/31/2021    [facility]  Vascular Lower Extremities DVT Study Procedure   Patient Name    Darren Bahena  Date of Study           12/31/2021                  A   Date of Birth   1970  Gender                  Female   Age             54 year(s)  Race                       Room Number     35   Corporate ID #  [id number]   Patient Acct #  [de-identified]   MR #            [id number]     Sonographer             Cait Linn RVT   Accession #     [id number]  Interpreting Physician  [address] [place] [address]   Referring Nurse             Referring Physician     ER MD *  Practitioner  Procedure Type of Study:   Veins: Lower Extremities DVT Study, Venous Scan Lower Bilateral.  Indications for Study:Hx of DVT. Patient Status:ER. - Critical Result:García Hay DO at 5 pm.  Conclusions   Summary   Age indeterminate deep vein thrombosis of the right leg involving the  femoral, popliteal, posterior tibial, and peroneal veins. Signature   ----------------------------------------------------------------  Electronically signed by Denise Herrera RVT(Sonographer) on  12/31/2021 05:15 PM  ----------------------------------------------------------------   ----------------------------------------------------------------  Electronically signed by Theone Lehmann Reyes,Arthur(Interpreting  physician) on 12/31/2021 05:34 PM  ----------------------------------------------------------------  Findings:   Right Impression:                     Left Impression:  The common femoral, femoral,          The mid to distal femoral,  popliteal and tibial veins            popliteal, posterior tibial and  demonstrate normal compressibility    peroneal veins are non compressible  and augmentation. Normal              with mixed echoes. The small  compressibility of the great          saphenous vein is non-compressible  saphenous vein. Normal                just of the popliteal fossa. compressibility of the small          The left common femoral, proximal  saphenous vein. femoral and greater saphenous veins                                        are compressible with normal venous                                        doppler responses. Risk Factors History +----------------+----------+----------------------------------------------+ ! Diagnosis       ! Date      ! Comments                                      ! +----------------+----------+----------------------------------------------+ ! Crystal Bourgeois     !          !                                              !  !Filter          !          !                                              ! +----------------+----------+----------------------------------------------+ ! DVT             ! !H/O Left leg                                  ! +----------------+----------+----------------------------------------------+ ! Pulmonary       ! !H/O                                           ! !Embolism        !          !                                              ! +----------------+----------+----------------------------------------------+ ! Recent surgery  ! ! Total hysterectomy and LT knee surgery        ! !                ! !December 2021                                 ! +----------------+----------+----------------------------------------------+ ! Previous Scan   !02/01/2019! WNL                                           ! +----------------+----------+----------------------------------------------+   - The patient's risk factor(s) include: previous angina/MI. Allergies   - Allergy:*Unlisted(Drug). Comments:lamisil, nubain Velocities are measured in cm/s ; Diameters are measured in cm Right Lower Extremities DVT Study Measurements Right 2D Measurements +------------------------------------+----------+---------------+----------+ ! Location                            ! Visualized! Compressibility! Thrombosis! +------------------------------------+----------+---------------+----------+ ! Common Femoral                      !Yes       ! Yes            ! None      ! +------------------------------------+----------+---------------+----------+ ! Prox Femoral                        !Yes       ! Yes            ! None      ! +------------------------------------+----------+---------------+----------+ ! Mid Femoral                         !Yes       ! Yes            ! None      ! +------------------------------------+----------+---------------+----------+ ! Dist Femoral                        !Yes       ! Yes            ! None      ! +------------------------------------+----------+---------------+----------+ ! Deep Femoral                        !Yes       ! Yes            ! None      ! +------------------------------------+----------+---------------+----------+ ! Popliteal                           !Yes       ! Yes            ! None      ! +------------------------------------+----------+---------------+----------+ ! Sapheno Femoral Junction            ! Yes       ! Yes            ! None      ! +------------------------------------+----------+---------------+----------+ ! PTV                                 ! Yes       ! Yes            ! None      ! +------------------------------------+----------+---------------+----------+ ! Peroneal                            !Yes       ! Yes            ! None      ! +------------------------------------+----------+---------------+----------+ ! Gastroc                             ! Yes       ! Yes            ! None      ! +------------------------------------+----------+---------------+----------+ ! GSV Thigh                           ! Yes       ! Yes            ! None      ! +------------------------------------+----------+---------------+----------+ ! GSV Knee                            ! Yes       ! Yes            ! None      ! +------------------------------------+----------+---------------+----------+ ! GSV Ankle                           ! Yes       ! Yes            ! None      ! +------------------------------------+----------+---------------+----------+ ! SSV                                 ! Yes       ! Partial        !AI        ! +------------------------------------+----------+---------------+----------+ Right Doppler Measurements +---------------------------+------+------+--------------------------------+ ! Location                   ! Signal!Reflux! Reflux (msec)                   ! +---------------------------+------+------+--------------------------------+ ! Common Femoral             !Phasic!      ! ! +---------------------------+------+------+--------------------------------+ ! Prox Femoral               !Phasic!      !                                ! +---------------------------+------+------+--------------------------------+ ! Popliteal                  !Phasic!      !                                ! +---------------------------+------+------+--------------------------------+ Left Lower Extremities DVT Study Measurements Left 2D Measurements +------------------------------------+----------+---------------+----------+ ! Location                            ! Visualized! Compressibility! Thrombosis! +------------------------------------+----------+---------------+----------+ ! Common Femoral                      !Yes       ! Yes            ! None      ! +------------------------------------+----------+---------------+----------+ ! Prox Femoral                        !Yes       ! Yes            ! None      ! +------------------------------------+----------+---------------+----------+ ! Mid Femoral                         !Yes       ! Partial        !AI        ! +------------------------------------+----------+---------------+----------+ ! Dist Femoral                        !Yes       ! No             !AI        ! +------------------------------------+----------+---------------+----------+ ! Deep Femoral                        !Yes       ! Yes            ! None      ! +------------------------------------+----------+---------------+----------+ ! Popliteal                           !Yes       ! No             !AI        ! +------------------------------------+----------+---------------+----------+ ! Sapheno Femoral Junction            ! Yes       ! Yes            ! None      ! +------------------------------------+----------+---------------+----------+ ! PTV                                 ! Yes       ! No             !AI        ! +------------------------------------+----------+---------------+----------+ ! Mariella !Yes       !No             !AI        ! +------------------------------------+----------+---------------+----------+ ! Gastroc                             ! Yes       ! Yes            ! None      ! +------------------------------------+----------+---------------+----------+ ! GSV Thigh                           ! Yes       ! Yes            ! None      ! +------------------------------------+----------+---------------+----------+ ! GSV Knee                            ! Yes       ! Yes            ! None      ! +------------------------------------+----------+---------------+----------+ ! GSV Ankle                           ! Yes       ! Yes            ! None      ! +------------------------------------+----------+---------------+----------+ ! SSV                                 ! Yes       ! No             !AI        ! +------------------------------------+----------+---------------+----------+ Left Doppler Measurements +-------------------------+----------+------+------------------------------+ ! Location                 ! Signal    !Reflux! Reflux (msec)                 ! +-------------------------+----------+------+------------------------------+ ! Common Femoral           !Phasic    !      !                              ! +-------------------------+----------+------+------------------------------+ ! Prox Femoral             !Continuous!      !                              ! +-------------------------+----------+------+------------------------------+ ! Popliteal                !Continuous!      !                              ! +-------------------------+----------+------+------------------------------+      RECENT VITALS:     Temp: 97.9 °F (36.6 °C),  Pulse: 88, Resp: 18, BP: 115/77, SpO2: 97 %      This patient is a 54 y.o. Female with pain in her left lower extremity.   Patient has a history of methylenetetrahydrofolate reductase deficiency and also hypercoagulable states she has had multiple PEs and DVTs and is status post [place] filter she had a hysterectomy during the month and required a week of Lovenox after that event on 16 December she underwent an arthroscopic scopic procedure of her left knee and has since had sudden onset of severe pain and swelling she thought she had a Baker's cyst had a DVT study yesterday at [facility] which showed a popliteal DVT she is having worsening pain since being started on 10 mg of Eliquis which she took last evening and again this morning she is concerned about worsening of the clot repeat DVT study shows the clot extending to the mid thigh as well as involving the peroneal vessels vascular surgery is been consulted do not think she is a current candidate for a thrombectomy. Patient admitted for pain control and vascular surgery reevaluation in the morning      ED Course as of 12/31/21 1843   Fri Dec 31, 2021   1552 Received a week of lovenox earlier this month after a hysterectomy. Very high risk will get b/l doppler. May be eliquis failure but questionable given short time frame of treatment  [BG]   1800 Case discussed with vascular surgery resident [BG]   1800    Findings:      Right Impression:                     Left Impression:   The common femoral, femoral,          The mid to distal femoral,   popliteal and tibial veins            popliteal, posterior tibial and   demonstrate normal compressibility    peroneal veins are non compressible   and augmentation. Normal              with mixed echoes. The small   compressibility of the great          saphenous vein is non-compressible   saphenous vein. Normal                just of the popliteal fossa. compressibility of the small          The left common femoral, proximal   saphenous vein. femoral and greater saphenous veins                                         are compressible with normal venous                                         doppler responses.     [BG]      ED Course User Index  [BG] Dank Luna DO

## 2021-12-31 NOTE — ED NOTES
PT presents to ED following increased pain behind left knee and thigh following positive doppler for blood clots     Josesito Mittal RN  12/31/21 7619

## 2021-12-31 NOTE — CONSULTS
[address]    Patient's Name/ Date of Birth/ Gender: Shaunna Price / 1970 (54 y.o.) / female     Referring Physician: Maxine Mcelroy MD    Consulting Physician: Dr. Tanisha Sanchez     History of present Illness: Pt is a 54 y.o. female with past medical history of MTHFR and PE at age 25 s/p IVC filter placement (not on [facility] prior to this DVT episode) who presents with concerns for left lower extremity pain. The patient endorses pain under her left knee extending up her left thigh that has been present for several days. It has made ambulation difficult due to pain. She denies coolness, numbness/tingling, no edema of the extremities. She has a history of hysterectomy and left knee scope in December of this year. She does not feel like she has been less active than her norm. She presented to the ED at [facility] yesterday and duplex of the LLE revealed DVT in the left popliteal, posterior tibial, peroneal, and deep calf veins. She was discharged on eliquis. Duplex was repeated here showing age indeterminate left femoral, popliteal, posterior tibial, and peroneal DVT. Past Medical History:  has a past medical history of NSTEMI (non-ST elevated myocardial infarction) ([address] and Pulmonary emboli ([address] Past Surgical History:   Past Surgical History:   Procedure Laterality Date    CARDIAC CATHETERIZATION  02/2019    CHOLECYSTECTOMY      VENA CAVA FILTER PLACEMENT      VENA CAVA FILTER PLACEMENT         Social History:  reports that she has never smoked. She has never used smokeless tobacco. She reports current alcohol use. She reports that she does not use drugs. Family History: family history includes Breast Cancer in her maternal aunt; Cancer in her maternal grandfather, paternal grandmother, and paternal uncle.     Review of Systems:   General: Denies fever, chills, night sweats, weight loss  HEENT: Denies sore throat, sinus problems  Card: Denies chest pain, palpitations  Pulm: Denies cough, shortness of breath  GI: denies history of abdominal pain, nausea, emesis   : Denies polyuria, dysuria, hematuria  Endo: Denies polydipsia, heat/cold intolerance   Heme: history of clotting, history of PE, no easy bleeding   Neuro: Denies numbness/tingling, HA, changes in vision   Skin: Denies rashes, ulcers  Musculoskeletal: Endorses LLE pain. Denies joint, back pain. Allergies: Lamisil [terbinafine hcl], Nubain [nalbuphine hcl], and Other    Current Meds:No current facility-administered medications for this encounter.     Current Outpatient Medications:     apixaban (ELIQUIS) 5 MG TABS tablet, Take 10 mg by mouth 2 times daily, Disp: , Rfl:     DULoxetine (CYMBALTA) 20 MG extended release capsule, take 1 capsule by mouth once daily, Disp: 30 capsule, Rfl: 5    Vital Signs:  Vitals:    12/31/21 1450   BP: 115/77   Pulse: 88   Resp: 18   Temp: 97.9 °F (36.6 °C)   SpO2: 97%       Physical Exam:  Gen:  A&Ox3, NAD  HEENT: EOMI, no scleral icterus, oral mucosa moist  Neck: Supple, trachea midline   Chest: Symmetric rise with inhalation, no evidence of trauma  CVS: Regular rate and rhythm  Resp: Normal effort, no accessory muscle use, no respiratory distress  Abd: soft, non-tender, non-distended   Ext: No clubbing, cyanosis, edema, peripheral pulses 2+ Rad/Fem/DP/PT, tender to palpation L knee and left thigh   CNS: Moves all extremities, no gross focal motor deficits  Skin: No erythema or ulcerations     Labs:   Lab Results   Component Value Date    WBC 7.9 12/31/2021    HGB 12.8 12/31/2021    HCT 36.5 12/31/2021    MCV 94.8 12/31/2021     12/31/2021     Lab Results   Component Value Date     12/31/2021    K 4.0 12/31/2021     12/31/2021    CO2 22 12/31/2021    BUN 15 12/31/2021    CREATININE 0.64 12/31/2021    GLUCOSE 96 12/31/2021    CALCIUM 9.2 12/31/2021     Lab Results   Component Value Date    INR 0.9 12/31/2021     Imaging  Duplex US LLE       Age indeterminate deep vein thrombosis of the right leg involving the    femoral, popliteal, posterior tibial, and peroneal veins. Impression:  Patient Active Problem List   Diagnosis    Atypical chest pain    Tingling in extremities    Muscle pain    PE (pulmonary thromboembolism) ([address]    Acute deep vein thrombosis (DVT) of left femoral vein (HCC)     Recommendation:    1. Duplex US imaging revealed with vascular attending. Clots appear age indeterminate and clot burden not enough to warrant thrombectomy. No plans for surgical intervention. 2. Recommend continuation of Eliquis. Patient also has an IVC filter in place.    3. Follow up outpatient with Dr. Hilary Mcgowan, DO  General Surgery PGY-3

## 2021-12-31 NOTE — ED PROVIDER NOTES
[facility]     Emergency Department     Faculty Attestation    I performed a history and physical examination of the patient and discussed management with the resident. I reviewed the residents note and agree with the documented findings and plan of care. Any areas of disagreement are noted on the chart. I was personally present for the key portions of any procedures. I have documented in the chart those procedures where I was not present during the key portions. I have reviewed the emergency nurses triage note. I agree with the chief complaint, past medical history, past surgical history, allergies, medications, social and family history as documented unless otherwise noted below. For Physician Assistant/ Nurse Practitioner cases/documentation I have personally evaluated this patient and have completed at least one if not all key elements of the E/M (history, physical exam, and MDM). Additional findings are as noted. I have personally seen and evaluated the patient. I find the patient's history and physical exam are consistent with the NP/PA documentation. I agree with the care provided, treatment rendered, disposition and follow-up plan. 44-year-old female with a history of MTHFR, PE at age 25 with [place] filter placed, diagnosed yesterday with DVT and started on Eliquis (2 doses taken) after recent left knee arthroscopy on 12/17. Patient also has recently had a hysterectomy on 12/6, and was on 7 days of Lovenox after that procedure. No chest pain or shortness of breath, however having worsening pain into the thigh. Has never been on chronic blood thinners.     Exam:  General: Laying on the bed, awake, alert and in no acute distress  CV: normal rate and regular rhythm  Lungs: Breathing comfortably on room air with no tachypnea, hypoxia, or increased work of breathing    Plan:  Repeat DVT study to rule out extension of clot into the proximal femoral veins  Will consider discussing with patient's hematologist and/or vascular surgery to discuss best anticoagulation if patient is worsening on the Eliquis, although she has only had 2 doses.         Josh Craig MD   Attending Emergency  Physician    (Please note that portions of this note were completed with a voice recognition program. Efforts were made to edit the dictations but occasionally words are mis-transcribed.)              Josh Craig MD  01/02/22 6780

## 2021-12-31 NOTE — ED PROVIDER NOTES
[facility] ED  Emergency Department Encounter  EmergencyMedicine Resident     Pt Deny Gill  MRN: [id number]  [place] 1970  Date of evaluation: 12/31/21  PCP:  Edenilson Bernal MD    This patient was evaluated in the Emergency Department for symptoms described in the history of present illness. The patient was evaluated in the context of the global COVID-19 pandemic, which necessitated consideration that the patient might be at risk for infection with the SARS-CoV-2 virus that causes COVID-19. Institutional protocols and algorithms that pertain to the evaluation of patients at risk for COVID-19 are in a state of rapid change based on information released by regulatory bodies including the CDC and federal and state organizations. These policies and algorithms were followed during the patient's care in the ED. CHIEF COMPLAINT       Chief Complaint   Patient presents with    Other     blood clot to the left calf, diagnosed yesterday, started on elowuis. pain is traveling up the knee into the thigh, uncontrolled by tylenol. pt has histrory of PE. recent Hyst        HISTORY OF PRESENT ILLNESS  (Location/Symptom, Timing/Onset, Context/Setting, Quality, Duration, Modifying Factors, Severity.)      Olya Varner is a 54 y.o. female who presents with for propagation of her known blood clot in her leg. Patient has a history of MTHFR mutation. He has a history of DVTs and PEs she is status post Faustina filter placement. She had a arthroscopic procedure on the left knee on the 16th and has some developed worsening pain she was diagnosed yesterday at [facility]  with a popliteal DVT in the left. She was started on Eliquis 10 mg twice a day she took a dose last evening and 1 dose this morning. Since last evening she is having worsening pain that is propagating up her left thigh proximal to the area of known popliteal DVT.   Denies any chest pain denies any shortness of breath. PAST MEDICAL / SURGICAL / SOCIAL / FAMILY HISTORY      has a past medical history of NSTEMI (non-ST elevated myocardial infarction) ([facility] [address] and Pulmonary emboli ([facility] [address] has a past surgical history that includes Vena Cava Filter Placement; Vena Cava Filter Placement; Cholecystectomy; and Cardiac catheterization (02/2019). Social History     Socioeconomic History    Marital status:      Spouse name: Not on file    Number of children: Not on file    Years of education: Not on file    Highest education level: Not on file   Occupational History    Not on file   Tobacco Use    Smoking status: Never Smoker    Smokeless tobacco: Never Used   Vaping Use    Vaping Use: Never used   Substance and Sexual Activity    Alcohol use: Yes     Comment: social    Drug use: No    Sexual activity: Yes     Partners: Male   Other Topics Concern    Not on file   Social History Narrative    Not on file     Social Determinants of Health     Financial Resource Strain:     Difficulty of Paying Living Expenses: Not on file   Food Insecurity:     Worried About Running Out of Food in the Last Year: Not on file    Jackie of Food in the Last Year: Not on file   Transportation Needs:     Lack of Transportation (Medical): Not on file    Lack of Transportation (Non-Medical):  Not on file   Physical Activity:     Days of Exercise per Week: Not on file    Minutes of Exercise per Session: Not on file   Stress:     Feeling of Stress : Not on file   Social Connections:     Frequency of Communication with Friends and Family: Not on file    Frequency of Social Gatherings with Friends and Family: Not on file    Attends Sabianism Services: Not on file    Active Member of Clubs or Organizations: Not on file    Attends Club or Organization Meetings: Not on file    Marital Status: Not on file   Intimate Partner Violence:     Fear of Current or Ex-Partner: Not on file    Emotionally Abused: Not on file   [facility] Physically Abused: Not on file    Sexually Abused: Not on file   Housing Stability:     Unable to Pay for Housing in the Last Year: Not on file    Number of Places Lived in the Last Year: Not on file    Unstable Housing in the Last Year: Not on file       Family History   Problem Relation Age of Onset    Breast Cancer Maternal Aunt     Cancer Paternal Uncle     Cancer Maternal Grandfather     Cancer Paternal Grandmother        Allergies:  Lamisil [terbinafine hcl], Nubain [nalbuphine hcl], and Other    Home Medications:  Prior to Admission medications    Medication Sig Start Date End Date Taking? Authorizing Provider   DULoxetine (CYMBALTA) 20 MG extended release capsule take 1 capsule by mouth once daily 11/10/20   JANAE Cadet - CNP       REVIEW OF SYSTEMS    (2-9 systems for level 4, 10 or more for level 5)      Review of Systems   Constitutional: Negative for activity change, appetite change, chills and fever. HENT: Negative for congestion. Eyes: Negative for photophobia. Respiratory: Negative for cough and shortness of breath. Cardiovascular: Negative for chest pain and palpitations. Gastrointestinal: Negative for abdominal pain. Genitourinary: Negative for flank pain. Musculoskeletal: Negative for gait problem. Skin: Positive for wound. Allergic/Immunologic: Negative for environmental allergies and food allergies. Neurological: Negative for syncope, weakness, light-headedness and headaches. Hematological:        MTHFR positive history of numerous clots   Psychiatric/Behavioral: Negative for agitation. PHYSICAL EXAM   (up to 7 for level 4, 8 or more for level 5)      INITIAL VITALS:   /77   Pulse 88   Temp 97.9 °F (36.6 °C) (Oral)   Resp 18   Ht 5' 6\" (1.676 m)   Wt 120 lb (54.4 kg)   LMP 11/01/2020 (Exact Date)   SpO2 97%   BMI 19.37 kg/m²     Physical Exam  Vitals and nursing note reviewed.    Constitutional:       General: She is not in acute distress. Appearance: Normal appearance. She is normal weight. She is not ill-appearing or toxic-appearing. HENT:      Head: Normocephalic and atraumatic. Right Ear: External ear normal.      Left Ear: External ear normal.      Nose: Nose normal.      Mouth/Throat:      Pharynx: Oropharynx is clear. Eyes:      Conjunctiva/sclera: Conjunctivae normal.   Cardiovascular:      Pulses: Normal pulses. Pulmonary:      Effort: Pulmonary effort is normal. No respiratory distress. Breath sounds: No wheezing, rhonchi or rales. Abdominal:      Palpations: Abdomen is soft. Tenderness: There is no abdominal tenderness. Musculoskeletal:         General: Swelling and tenderness present. Normal range of motion. Cervical back: Normal range of motion. Comments: Swelling in the left popliteal region tenderness palpation in the left posterior thigh   Skin:     Capillary Refill: Capillary refill takes less than 2 seconds. Findings: No bruising or erythema. Neurological:      Mental Status: She is alert and oriented to person, place, and time.    Psychiatric:         Mood and Affect: Mood normal.         DIFFERENTIAL  DIAGNOSIS     PLAN (LABS / IMAGING / EKG):  Orders Placed This Encounter   Procedures    COVID-19, Rapid    VL DUP LOWER EXTREMITY VENOUS BILATERAL    VL DUP LOWER EXTREMITY VENOUS BILATERAL    CBC WITH AUTO DIFFERENTIAL    COMPREHENSIVE METABOLIC PANEL    APTT    PROTIME-INR    Inpatient consult to Vascular Surgery    MI DUPLEX EXTREM VENOUS,UNI OR LTD    PATIENT STATUS (FROM ED OR OR/PROCEDURAL) Observation    Discharge patient       MEDICATIONS ORDERED:  Orders Placed This Encounter   Medications    ketorolac (TORADOL) injection 30 mg    fentaNYL (SUBLIMAZE) injection 50 mcg    DISCONTD: apixaban (ELIQUIS) tablet 10 mg    DISCONTD: DULoxetine (CYMBALTA) extended release capsule 20 mg    DISCONTD: sodium chloride flush 0.9 % injection 5-40 mL    DISCONTD: sodium chloride flush 0.9 % injection 5-40 mL    DISCONTD: 0.9 % sodium chloride infusion    DISCONTD: acetaminophen (TYLENOL) tablet 650 mg    DISCONTD: ondansetron (ZOFRAN-ODT) disintegrating tablet 4 mg    DISCONTD: ondansetron (ZOFRAN) injection 4 mg    DISCONTD: HYDROcodone-acetaminophen (NORCO) 5-325 MG per tablet 1 tablet    DISCONTD: HYDROcodone-acetaminophen (NORCO) 5-325 MG per tablet 2 tablet    DISCONTD: fentaNYL (SUBLIMAZE) injection 25 mcg    DISCONTD: fentaNYL (SUBLIMAZE) injection 50 mcg       DDX: dvt    DIAGNOSTIC RESULTS / EMERGENCY DEPARTMENT COURSE / MDM   LAB RESULTS:  Results for orders placed or performed during the hospital encounter of 12/31/21   COVID-19, Rapid    Specimen: Nasopharyngeal Swab   Result Value Ref Range    Specimen Description . NASOPHARYNGEAL SWAB     SARS-CoV-2, Rapid Not Detected Not Detected   CBC WITH AUTO DIFFERENTIAL   Result Value Ref Range    WBC 7.9 3.5 - 11.3 k/uL    RBC 3.85 (L) 3.95 - 5.11 m/uL    Hemoglobin 12.8 11.9 - 15.1 g/dL    Hematocrit 36.5 36.3 - 47.1 %    MCV 94.8 82.6 - 102.9 fL    MCH 33.2 25.2 - 33.5 pg    MCHC 35.1 (H) 28.4 - 34.8 g/dL    RDW 11.7 (L) 11.8 - 14.4 %    Platelets 333 844 - 395 k/uL    MPV 8.8 8.1 - 13.5 fL    NRBC Automated 0.0 0.0 per 100 WBC    Differential Type NOT REPORTED     Seg Neutrophils 59 36 - 65 %    Lymphocytes 26 24 - 43 %    Monocytes 10 3 - 12 %    Eosinophils % 4 1 - 4 %    Basophils 1 0 - 2 %    Immature Granulocytes 0 0 %    Segs Absolute 4.72 1.50 - 8.10 k/uL    Absolute Lymph # 2.06 1.10 - 3.70 k/uL    Absolute Mono # 0.75 0.10 - 1.20 k/uL    Absolute Eos # 0.33 0.00 - 0.44 k/uL    Basophils Absolute 0.05 0.00 - 0.20 k/uL    Absolute Immature Granulocyte <0.03 0.00 - 0.30 k/uL    WBC Morphology NOT REPORTED     RBC Morphology NOT REPORTED     Platelet Estimate NOT REPORTED    COMPREHENSIVE METABOLIC PANEL   Result Value Ref Range    Glucose 96 70 - 99 mg/dL    BUN 15 6 - 20 mg/dL    CREATININE 0.64 0.50 - 0.90 mg/dL    Bun/Cre Ratio NOT REPORTED 9 - 20    Calcium 9.2 8.6 - 10.4 mg/dL    Sodium 136 135 - 144 mmol/L    Potassium 4.0 3.7 - 5.3 mmol/L    Chloride 102 98 - 107 mmol/L    CO2 22 20 - 31 mmol/L    Anion Gap 12 9 - 17 mmol/L    Alkaline Phosphatase 93 35 - 104 U/L    ALT 15 5 - 33 U/L    AST 16 <32 U/L    Total Bilirubin 0.25 (L) 0.3 - 1.2 mg/dL    Total Protein 8.1 6.4 - 8.3 g/dL    Albumin 4.7 3.5 - 5.2 g/dL    Albumin/Globulin Ratio 1.4 1.0 - 2.5    GFR Non-African American >60 >60 mL/min    GFR African American >60 >60 mL/min    GFR Comment          GFR Staging NOT REPORTED    APTT   Result Value Ref Range    PTT 32.5 (H) 20.5 - 30.5 sec   PROTIME-INR   Result Value Ref Range    Protime 10.2 9.1 - 12.3 sec    INR 0.9        IMPRESSION: Patient is an alert oriented nontoxic 42-year-old female no shortness of breath no respiratory distress no chest pain no palpitations abdomen is soft nontender palpable popliteal swelling left side consistent with diagnosis of DVT concern for propagation proximally will obtain labs coags provide analgesia Doppler of bilateral lower extremities given patient's history    RADIOLOGY:  VL DUP LOWER EXTREMITY VENOUS BILATERAL    Result Date: 12/31/2021    [facility]  Vascular Lower Extremities DVT Study Procedure   Patient Name    Apollo Rome  Date of Study           12/31/2021                  A   Date of Birth   1970  Gender                  Female   Age             54 year(s)  Race                       Room Number     35   Corporate ID #  [id number]   Patient Acct #  [de-identified]   MR #            [id number]     Sonographer             Shannan Adams T   Accession #     [id number]  Interpreting Physician  [address] [place] [address]   Referring Nurse             Referring Physician     ER MD *  Practitioner  Procedure Type of Study:   Veins: Lower Extremities DVT Study, Venous Scan Lower Bilateral.  Indications for Study:Hx of DVT. Patient Status:ER.    - Critical Result:García Hay DO at 5 pm.  Conclusions   Summary   Age indeterminate deep vein thrombosis of the right leg involving the  femoral, popliteal, posterior tibial, and peroneal veins. Signature   ----------------------------------------------------------------  Electronically signed by Jj Agosto RVT(Sonographer) on  12/31/2021 05:15 PM  ----------------------------------------------------------------   ----------------------------------------------------------------  Electronically signed by Jonita Hurdle Reyes,Arthur(Interpreting  physician) on 12/31/2021 05:34 PM  ----------------------------------------------------------------  Findings:   Right Impression:                     Left Impression:  The common femoral, femoral,          The mid to distal femoral,  popliteal and tibial veins            popliteal, posterior tibial and  demonstrate normal compressibility    peroneal veins are non compressible  and augmentation. Normal              with mixed echoes. The small  compressibility of the great          saphenous vein is non-compressible  saphenous vein. Normal                just of the popliteal fossa. compressibility of the small          The left common femoral, proximal  saphenous vein. femoral and greater saphenous veins                                        are compressible with normal venous                                        doppler responses. Risk Factors History +----------------+----------+----------------------------------------------+ ! Diagnosis       ! Date      ! Comments                                      ! +----------------+----------+----------------------------------------------+ ! Dawood Lagroshan     !          !                                              ! !Filter          !          !                                              ! +----------------+----------+----------------------------------------------+ ! DVT             !           !H/O Left leg ! +----------------+----------+----------------------------------------------+ ! Pulmonary       ! !H/O                                           ! !Embolism        !          !                                              ! +----------------+----------+----------------------------------------------+ ! Recent surgery  ! ! Total hysterectomy and LT knee surgery        ! !                ! !December 2021                                 ! +----------------+----------+----------------------------------------------+ ! Previous Scan   !02/01/2019! WNL                                           ! +----------------+----------+----------------------------------------------+   - The patient's risk factor(s) include: previous angina/MI. Allergies   - Allergy:*Unlisted(Drug). Comments:lamisil, nubain Velocities are measured in cm/s ; Diameters are measured in cm Right Lower Extremities DVT Study Measurements Right 2D Measurements +------------------------------------+----------+---------------+----------+ ! Location                            ! Visualized! Compressibility! Thrombosis! +------------------------------------+----------+---------------+----------+ ! Common Femoral                      !Yes       ! Yes            ! None      ! +------------------------------------+----------+---------------+----------+ ! Prox Femoral                        !Yes       ! Yes            ! None      ! +------------------------------------+----------+---------------+----------+ ! Mid Femoral                         !Yes       ! Yes            ! None      ! +------------------------------------+----------+---------------+----------+ ! Dist Femoral                        !Yes       ! Yes            ! None      ! +------------------------------------+----------+---------------+----------+ ! Deep Femoral                        !Yes       ! Yes            ! None      ! +------------------------------------+----------+---------------+----------+ ! Popliteal                           !Yes       ! Yes            ! None      ! +------------------------------------+----------+---------------+----------+ ! Sapheno Femoral Junction            ! Yes       ! Yes            ! None      ! +------------------------------------+----------+---------------+----------+ ! PTV                                 ! Yes       ! Yes            ! None      ! +------------------------------------+----------+---------------+----------+ ! Peroneal                            !Yes       ! Yes            ! None      ! +------------------------------------+----------+---------------+----------+ ! Gastroc                             ! Yes       ! Yes            ! None      ! +------------------------------------+----------+---------------+----------+ ! GSV Thigh                           ! Yes       ! Yes            ! None      ! +------------------------------------+----------+---------------+----------+ ! GSV Knee                            ! Yes       ! Yes            ! None      ! +------------------------------------+----------+---------------+----------+ ! GSV Ankle                           ! Yes       ! Yes            ! None      ! +------------------------------------+----------+---------------+----------+ ! SSV                                 ! Yes       ! Partial        !AI        ! +------------------------------------+----------+---------------+----------+ Right Doppler Measurements +---------------------------+------+------+--------------------------------+ ! Location                   ! Signal!Reflux! Reflux (msec)                   ! +---------------------------+------+------+--------------------------------+ ! Common Femoral             !Phasic!      !                                ! +---------------------------+------+------+--------------------------------+ ! Prox Femoral               !Phasic!      ! ! +---------------------------+------+------+--------------------------------+ ! Popliteal                  !Phasic!      !                                ! +---------------------------+------+------+--------------------------------+ Left Lower Extremities DVT Study Measurements Left 2D Measurements +------------------------------------+----------+---------------+----------+ ! Location                            ! Visualized! Compressibility! Thrombosis! +------------------------------------+----------+---------------+----------+ ! Common Femoral                      !Yes       ! Yes            ! None      ! +------------------------------------+----------+---------------+----------+ ! Prox Femoral                        !Yes       ! Yes            ! None      ! +------------------------------------+----------+---------------+----------+ ! Mid Femoral                         !Yes       ! Partial        !AI        ! +------------------------------------+----------+---------------+----------+ ! Dist Femoral                        !Yes       ! No             !AI        ! +------------------------------------+----------+---------------+----------+ ! Deep Femoral                        !Yes       ! Yes            ! None      ! +------------------------------------+----------+---------------+----------+ ! Popliteal                           !Yes       ! No             !AI        ! +------------------------------------+----------+---------------+----------+ ! Sapheno Femoral Junction            ! Yes       ! Yes            ! None      ! +------------------------------------+----------+---------------+----------+ ! PTV                                 ! Yes       ! No             !AI        ! +------------------------------------+----------+---------------+----------+ ! Peroneal                            !Yes       ! No             !AI        ! +------------------------------------+----------+---------------+----------+ ! Gastroc !Yes       !Yes            ! None      ! +------------------------------------+----------+---------------+----------+ ! GSV Thigh                           ! Yes       ! Yes            ! None      ! +------------------------------------+----------+---------------+----------+ ! GSV Knee                            ! Yes       ! Yes            ! None      ! +------------------------------------+----------+---------------+----------+ ! GSV Ankle                           ! Yes       ! Yes            ! None      ! +------------------------------------+----------+---------------+----------+ ! SSV                                 ! Yes       ! No             !AI        ! +------------------------------------+----------+---------------+----------+ Left Doppler Measurements +-------------------------+----------+------+------------------------------+ ! Location                 ! Signal    !Reflux! Reflux (msec)                 ! +-------------------------+----------+------+------------------------------+ ! Common Femoral           !Phasic    !      !                              ! +-------------------------+----------+------+------------------------------+ ! Prox Femoral             !Continuous!      !                              ! +-------------------------+----------+------+------------------------------+ ! Popliteal                !Continuous!      !                              ! +-------------------------+----------+------+------------------------------+      EKG      All EKG's are interpreted by the Emergency Department Physician who either signs or Co-signs this chart in the absence of a cardiologist.    EMERGENCY DEPARTMENT COURSE:  ED Course as of 01/01/22 2045   Fri Dec 31, 2021   1552 Received a week of lovenox earlier this month after a hysterectomy. Very high risk will get b/l doppler.  May be eliquis failure but questionable given short time frame of treatment  [BG]   1800 Case discussed with vascular surgery resident [BG]   1800 Findings:      Right Impression:                     Left Impression:   The common femoral, femoral,          The mid to distal femoral,   popliteal and tibial veins            popliteal, posterior tibial and   demonstrate normal compressibility    peroneal veins are non compressible   and augmentation. Normal              with mixed echoes. The small   compressibility of the great          saphenous vein is non-compressible   saphenous vein. Normal                just of the popliteal fossa. compressibility of the small          The left common femoral, proximal   saphenous vein. femoral and greater saphenous veins                                         are compressible with normal venous                                         doppler responses. [BG]      ED Course User Index  [BG] Brian Oquendo DO         PROCEDURES:      CONSULTS:  IP CONSULT TO VASCULAR SURGERY    CRITICAL CARE:      FINAL IMPRESSION      1. Acute deep vein thrombosis (DVT) of left lower extremity, unspecified vein (HCC)          DISPOSITION / PLAN     DISPOSITION  admit to obs      PATIENT REFERRED TO:  No follow-up provider specified.     DISCHARGE MEDICATIONS:  New Prescriptions    No medications on file       Brian Oquendo DO  Emergency Medicine Resident    (Please note that portions of thisnote were completed with a voice recognition program.  Efforts were made to edit the dictations but occasionally words are mis-transcribed.)       Brian Oquendo DO  Resident  01/01/22 2045

## 2022-01-01 VITALS
BODY MASS INDEX: 19.29 KG/M2 | WEIGHT: 120 LBS | HEART RATE: 62 BPM | RESPIRATION RATE: 16 BRPM | TEMPERATURE: 98.2 F | DIASTOLIC BLOOD PRESSURE: 62 MMHG | HEIGHT: 66 IN | OXYGEN SATURATION: 96 % | SYSTOLIC BLOOD PRESSURE: 95 MMHG

## 2022-01-01 PROCEDURE — G0378 HOSPITAL OBSERVATION PER HR: HCPCS

## 2022-01-01 PROCEDURE — 2580000003 HC RX 258: Performed by: STUDENT IN AN ORGANIZED HEALTH CARE EDUCATION/TRAINING PROGRAM

## 2022-01-01 PROCEDURE — 6370000000 HC RX 637 (ALT 250 FOR IP): Performed by: STUDENT IN AN ORGANIZED HEALTH CARE EDUCATION/TRAINING PROGRAM

## 2022-01-01 RX ADMIN — HYDROCODONE BITARTRATE AND ACETAMINOPHEN 1 TABLET: 5; 325 TABLET ORAL at 08:15

## 2022-01-01 RX ADMIN — APIXABAN 10 MG: 5 TABLET, FILM COATED ORAL at 08:13

## 2022-01-01 RX ADMIN — HYDROCODONE BITARTRATE AND ACETAMINOPHEN 2 TABLET: 5; 325 TABLET ORAL at 02:22

## 2022-01-01 RX ADMIN — SODIUM CHLORIDE, PRESERVATIVE FREE 10 ML: 5 INJECTION INTRAVENOUS at 08:13

## 2022-01-01 ASSESSMENT — PAIN DESCRIPTION - LOCATION: LOCATION: KNEE;LEG

## 2022-01-01 ASSESSMENT — PAIN SCALES - GENERAL
PAINLEVEL_OUTOF10: 5
PAINLEVEL_OUTOF10: 7

## 2022-01-01 ASSESSMENT — PAIN DESCRIPTION - ORIENTATION: ORIENTATION: LEFT

## 2022-01-01 ASSESSMENT — PAIN DESCRIPTION - PAIN TYPE: TYPE: ACUTE PAIN

## 2022-01-01 NOTE — CARE COORDINATION
Case Management Initial Discharge Plan  Rosa Dean,             Met with:patient to discuss discharge plans. Information verified: address, contacts, phone number, , insurance Yes  Insurance Provider: [employer]    Emergency Contact/Next of Kin name & number:  sharon   Who are involved in patient's support system? Family     PCP: Chasity Price MD  Date of last visit: 2 yrs      Discharge Planning    Living Arrangements:    lives with      Home has 1 stories  2 stairs to climb to get into front door    Patient able to perform ADL's:Independent    Current Services (outpatient & in home) none  DME equipment: none  DME provider: none    Is patient receiving oral anticoagulation therapy?  eliquis     If indicated:   Physician managing anticoagulation treatment: [facility]         Potential Assistance Needed:   f/u pcp       Evaluation: no    Expected Discharge date:       Patient expects to be discharged to:   home    If home: is the family and/or caregiver wiling & able to provide support at home? yes  Who will be providing this support?      Follow Up Appointment: Best Day/ Time:      Transportation provider:   Transportation arrangements needed for discharge: No    Readmission Risk              Risk of Unplanned Readmission:  0             Does patient have a readmission risk score greater than 14?: No  If yes, follow-up appointment must be made within 7 days of discharge.      Goals of Care: safe transition plan       Educated yes on transitional options, provided freedom of choice and are agreeable with plan      Discharge Plan: return home with           Electronically signed by Jason Patricio RN on 22 at 11:53 AM EST

## 2022-01-01 NOTE — ED NOTES
.  ED to inpatient nurses report    Chief Complaint   Patient presents with    Other     blood clot to the left calf, diagnosed yesterday, started on elowuis. pain is traveling up the knee into the thigh, uncontrolled by tylenol.  pt has histrory of PE. recent Hyst       Present to ED from Home  LOC: alert and orientated to name, place, date  Vital signs   Vitals:    12/31/21 1450   BP: 115/77   Pulse: 88   Resp: 18   Temp: 97.9 °F (36.6 °C)   TempSrc: Oral   SpO2: 97%   Weight: 120 lb (54.4 kg)   Height: 5' 6\" (1.676 m)      Oxygen Baseline RA    Current needs required None   LDAs:   Peripheral IV 12/31/21 Right Antecubital (Active)     Mobility: Independent  Pending ED orders: None  Present condition: Stable  Code Status: [unfilled]   Consults:  []  Hospitalist  Completed  [] yes [] no  []  Medicine  Completed  [] yes [] No  []  Cardiology  Completed  [] yes [] No  []  GI   Completed  [] yes [] No  []  Neurology  Completed  [] yes [] No  []  Nephrology Completed  [] yes [] No  [x]  Vascular  Completed  [] yes [] No   []  Surgery  Completed  [] yes [] No   []  Urology  Completed  [] yes [] No   []  Plastics  Completed  [] yes [] No   []  ENT  Completed  [] yes [] No   []  Other   Completed  [] yes [] No  Pertinent event(s) None  Pertinent event(s) None  Electronically signed by Victorino Rinne, RN on 12/31/2021 at 9:41 PM     Victorino Rinne, [address] [place] [address]  12/31/21 1367

## 2022-01-01 NOTE — H&P
[facility]  CDU / OBSERVATION eNCOUnter  Resident Note     Pt Name: Teresita Chase  MRN: [id number]  [place] 1970  Date of evaluation: 1/1/22  Patient's PCP is :  Eilleen Saint, MD    CHIEF COMPLAINT       Chief Complaint   Patient presents with    Other     blood clot to the left calf, diagnosed yesterday, started on elowuis. pain is traveling up the knee into the thigh, uncontrolled by tylenol. pt has histrory of PE. recent Hyst          HISTORY OF PRESENT ILLNESS    Teresita Chase is a 54 y.o. female with MTHFR mutation and history of blood clots presented yesterday after diagnosis with DVT after recent left knee arthroscopy on 12/17. She also recently had a hysterectomy on 12/6. After her hysterectomy she was on Lovenox for 7 days, yesterday after diagnosis of her DVT she was placed on Eliquis. She is only taken 2 doses by the time she arrived to the emergency department. Patient did have pulmonary embolism at age 25 and a Faustina filter was placed. She reports that she has been on Coumadin on the past but does not like this medication, she has started the Eliquis but reports that it is expensive. She is interested in seeing if there is a cheaper alternative.     Location/Symptom: Thigh pain  Timing/Onset: 2 days  Provocation: DVT  Quality: Cramping  Radiation: up the leg  Severity: moderate  Timing/Duration: constant  Modifying Factors: none    REVIEW OF SYSTEMS     Bold positive  General ROS - No fevers, No malaise   Ophthalmic ROS - No discharge, No changes in vision  ENT ROS -  No sore throat, No rhinorrhea,   Respiratory ROS - no shortness of breath, no cough, no  wheezing  Cardiovascular ROS - No chest pain, no dyspnea on exertion  Gastrointestinal ROS - No abdominal pain, no nausea or vomiting, no change in bowel habits, no black or bloody stools  Genito-Urinary ROS - No dysuria, trouble voiding, or hematuria  Musculoskeletal ROS - No myalgias, No arthalgias  Neurological ROS - No headache, no dizziness/lightheadedness, No focal weakness, no loss of sensation  Dermatological ROS - No lesions, No rash     (PQRS) Advance directives on face sheet per hospital policy. No change unless specifically mentioned in chart    [address]    has a past medical history of NSTEMI (non-ST elevated myocardial infarction) ([facility] [address] and Pulmonary emboli ([facility] [address] I have reviewed the past medical history with the patient and it is pertinent to this complaint. SURGICAL HISTORY      has a past surgical history that includes Vena Cava Filter Placement; Vena Cava Filter Placement; Cholecystectomy; and Cardiac catheterization (02/2019). I have reviewed and agree with Surgical History entered and it is pertinent to this complaint. CURRENT MEDICATIONS     apixaban (ELIQUIS) tablet 10 mg, BID  DULoxetine (CYMBALTA) extended release capsule 20 mg, Daily  sodium chloride flush 0.9 % injection 5-40 mL, 2 times per day  sodium chloride flush 0.9 % injection 5-40 mL, PRN  0.9 % sodium chloride infusion, PRN  acetaminophen (TYLENOL) tablet 650 mg, Q4H PRN  ondansetron (ZOFRAN-ODT) disintegrating tablet 4 mg, Q8H PRN   Or  ondansetron (ZOFRAN) injection 4 mg, Q6H PRN  HYDROcodone-acetaminophen (NORCO) 5-325 MG per tablet 1 tablet, Q4H PRN   Or  HYDROcodone-acetaminophen (NORCO) 5-325 MG per tablet 2 tablet, Q4H PRN  fentaNYL (SUBLIMAZE) injection 25 mcg, Q1H PRN   Or  fentaNYL (SUBLIMAZE) injection 50 mcg, Q1H PRN        All medication charted and reviewed. ALLERGIES     is allergic to lamisil [terbinafine hcl], nubain [nalbuphine hcl], and other. FAMILY HISTORY     She indicated that the status of her maternal grandfather is unknown. She indicated that the status of her paternal grandmother is unknown. She indicated that the status of her maternal aunt is unknown.  She indicated that the status of her paternal uncle is unknown.     family history includes Breast Cancer in her maternal aunt; Cancer in her maternal grandfather, paternal grandmother, and paternal uncle. The patient denies any pertinent family history. I have reviewed and agree with the family history entered. I have reviewed the Family History and it is not significant to the case    SOCIAL HISTORY      reports that she has never smoked. She has never used smokeless tobacco. She reports current alcohol use. She reports that she does not use drugs. I have reviewed and agree with all Social.  There are no concerns for substance abuse/use. PHYSICAL EXAM     INITIAL VITALS:  height is 5' 6\" (1.676 m) and weight is 120 lb (54.4 kg). Her oral temperature is 98.2 °F (36.8 °C). Her blood pressure is 95/62 and her pulse is 62. Her respiration is 16 and oxygen saturation is 96%.       CONSTITUTIONAL: AOx4, no apparent distress, appears younger than stated age   HEAD: normocephalic, atraumatic   EYES: PERRLA, EOMI    ENT: moist mucous membranes, uvula midline   NECK: supple, symmetric   BACK: symmetric   LUNGS: clear to auscultation bilaterally   CARDIOVASCULAR: regular rate and rhythm, no murmurs, rubs or gallops   ABDOMEN: soft, non-tender, non-distended with normal active bowel sounds   NEUROLOGIC:  Left leg pain, surgical incisions c/d/i   MUSCULOSKELETAL: no clubbing, cyanosis or edema   SKIN: no rash or wounds       DIFFERENTIAL DIAGNOSIS/MDM:    Leg pain  Differential includes DVT, recent surgery, OA    DIAGNOSTIC RESULTS        RADIOLOGY:   I directly visualized the following  images and reviewed the radiologist interpretations:    VL DUP LOWER EXTREMITY VENOUS BILATERAL    Result Date: 12/31/2021    [facility]  Vascular Lower Extremities DVT Study Procedure   Patient Name    Amber Camarillo  Date of Study           12/31/2021                  A   Date of Birth   1970  Gender                  Female   Age             54 year(s)  Race                       Room Number     33   Corporate ID #  [id number]   Patient Acct #  [de-identified]   MR #            [id number]     Sonographer             Lori Joy RVT   Accession #     [id number]  Interpreting Physician  [address] [place] [address]   Referring Nurse             Referring Physician     ER MD *  Practitioner  Procedure Type of Study:   Veins: Lower Extremities DVT Study, Venous Scan Lower Bilateral.  Indications for Study:Hx of DVT. Patient Status:ER. - Critical Result:García Hay DO at 5 pm.  Conclusions   Summary   Age indeterminate deep vein thrombosis of the right leg involving the  femoral, popliteal, posterior tibial, and peroneal veins. Signature   ----------------------------------------------------------------  Electronically signed by Lori Joy RVT(Sonographer) on  12/31/2021 05:15 PM  ----------------------------------------------------------------   ----------------------------------------------------------------  Electronically signed by Berneda Douglas Reyes,Arthur(Interpreting  physician) on 12/31/2021 05:34 PM  ----------------------------------------------------------------  Findings:   Right Impression:                     Left Impression:  The common femoral, femoral,          The mid to distal femoral,  popliteal and tibial veins            popliteal, posterior tibial and  demonstrate normal compressibility    peroneal veins are non compressible  and augmentation. Normal              with mixed echoes. The small  compressibility of the great          saphenous vein is non-compressible  saphenous vein. Normal                just of the popliteal fossa. compressibility of the small          The left common femoral, proximal  saphenous vein. femoral and greater saphenous veins                                        are compressible with normal venous                                        doppler responses. Risk Factors History +----------------+----------+----------------------------------------------+ ! Diagnosis !Date      ! Comments                                      ! +----------------+----------+----------------------------------------------+ ! Migueljordyn Hashimoto     !          !                                              ! !Filter          !          !                                              ! +----------------+----------+----------------------------------------------+ ! DVT             ! !H/O Left leg                                  ! +----------------+----------+----------------------------------------------+ ! Pulmonary       ! !H/O                                           ! !Embolism        !          !                                              ! +----------------+----------+----------------------------------------------+ ! Recent surgery  ! ! Total hysterectomy and LT knee surgery        ! !                ! !December 2021                                 ! +----------------+----------+----------------------------------------------+ ! Previous Scan   !02/01/2019! WNL                                           ! +----------------+----------+----------------------------------------------+   - The patient's risk factor(s) include: previous angina/MI. Allergies   - Allergy:*Unlisted(Drug). Comments:lamisil, nubain Velocities are measured in cm/s ; Diameters are measured in cm Right Lower Extremities DVT Study Measurements Right 2D Measurements +------------------------------------+----------+---------------+----------+ ! Location                            ! Visualized! Compressibility! Thrombosis! +------------------------------------+----------+---------------+----------+ ! Common Femoral                      !Yes       ! Yes            ! None      ! +------------------------------------+----------+---------------+----------+ ! Prox Femoral                        !Yes       ! Yes            ! None      ! +------------------------------------+----------+---------------+----------+ ! Mid Femoral !Yes       !Yes            ! None      ! +------------------------------------+----------+---------------+----------+ ! Dist Femoral                        !Yes       ! Yes            ! None      ! +------------------------------------+----------+---------------+----------+ ! Deep Femoral                        !Yes       ! Yes            ! None      ! +------------------------------------+----------+---------------+----------+ ! Popliteal                           !Yes       ! Yes            ! None      ! +------------------------------------+----------+---------------+----------+ ! Sapheno Femoral Junction            ! Yes       ! Yes            ! None      ! +------------------------------------+----------+---------------+----------+ ! PTV                                 ! Yes       ! Yes            ! None      ! +------------------------------------+----------+---------------+----------+ ! Peroneal                            !Yes       ! Yes            ! None      ! +------------------------------------+----------+---------------+----------+ ! Gastroc                             ! Yes       ! Yes            ! None      ! +------------------------------------+----------+---------------+----------+ ! GSV Thigh                           ! Yes       ! Yes            ! None      ! +------------------------------------+----------+---------------+----------+ ! GSV Knee                            ! Yes       ! Yes            ! None      ! +------------------------------------+----------+---------------+----------+ ! GSV Ankle                           ! Yes       ! Yes            ! None      ! +------------------------------------+----------+---------------+----------+ ! SSV                                 ! Yes       ! Partial        !AI        ! +------------------------------------+----------+---------------+----------+ Right Doppler Measurements +---------------------------+------+------+--------------------------------+ ! Location !Signal!Reflux! Reflux (msec)                   ! +---------------------------+------+------+--------------------------------+ ! Common Femoral             !Phasic!      !                                ! +---------------------------+------+------+--------------------------------+ ! Prox Femoral               !Phasic!      !                                ! +---------------------------+------+------+--------------------------------+ ! Popliteal                  !Phasic!      !                                ! +---------------------------+------+------+--------------------------------+ Left Lower Extremities DVT Study Measurements Left 2D Measurements +------------------------------------+----------+---------------+----------+ ! Location                            ! Visualized! Compressibility! Thrombosis! +------------------------------------+----------+---------------+----------+ ! Common Femoral                      !Yes       ! Yes            ! None      ! +------------------------------------+----------+---------------+----------+ ! Prox Femoral                        !Yes       ! Yes            ! None      ! +------------------------------------+----------+---------------+----------+ ! Mid Femoral                         !Yes       ! Partial        !AI        ! +------------------------------------+----------+---------------+----------+ ! Dist Femoral                        !Yes       ! No             !AI        ! +------------------------------------+----------+---------------+----------+ ! Deep Femoral                        !Yes       ! Yes            ! None      ! +------------------------------------+----------+---------------+----------+ ! Popliteal                           !Yes       ! No             !AI        ! +------------------------------------+----------+---------------+----------+ ! Sapheno Femoral Junction            ! Yes       ! Yes            ! None      ! +------------------------------------+----------+---------------+----------+ ! PTV                                 ! Yes       ! No             !AI        ! +------------------------------------+----------+---------------+----------+ ! Peroneal                            !Yes       ! No             !AI        ! +------------------------------------+----------+---------------+----------+ ! Gastroc                             ! Yes       ! Yes            ! None      ! +------------------------------------+----------+---------------+----------+ ! GSV Thigh                           ! Yes       ! Yes            ! None      ! +------------------------------------+----------+---------------+----------+ ! GSV Knee                            ! Yes       ! Yes            ! None      ! +------------------------------------+----------+---------------+----------+ ! GSV Ankle                           ! Yes       ! Yes            ! None      ! +------------------------------------+----------+---------------+----------+ ! SSV                                 ! Yes       ! No             !AI        ! +------------------------------------+----------+---------------+----------+ Left Doppler Measurements +-------------------------+----------+------+------------------------------+ ! Location                 ! Signal    !Reflux! Reflux (msec)                 ! +-------------------------+----------+------+------------------------------+ ! Common Femoral           !Phasic    !      !                              ! +-------------------------+----------+------+------------------------------+ ! Prox Femoral             !Continuous!      !                              ! +-------------------------+----------+------+------------------------------+ ! Popliteal                !Continuous!      !                              ! +-------------------------+----------+------+------------------------------+      LABS:  I have reviewed and interpreted all available lab results. Labs Reviewed   CBC WITH AUTO DIFFERENTIAL - Abnormal; Notable for the following components:       Result Value    RBC 3.85 (*)     MCHC 35.1 (*)     RDW 11.7 (*)     All other components within normal limits   COMPREHENSIVE METABOLIC PANEL - Abnormal; Notable for the following components: Total Bilirubin 0.25 (*)     All other components within normal limits   APTT - Abnormal; Notable for the following components:    PTT 32.5 (*)     All other components within normal limits   COVID-19, RAPID   PROTIME-INR        CDU GENA / Ernestine Almanza is a 54 y.o. female who presents with     · DVT  · Patient on Eliquis with IVC filter  · Clot burden did not warrant thrombectomy  · Follow up with Dr. Valera Earing OP  · Will discuss with attending, patient considering alternatives for [facility]. Coumadin is the cheaper alternative, however patient has taken this in the past and does not want coumadin  · Considering addition of baby aspirin daily  · Continue home medications and pain control  · Monitor vitals, labs, and imaging  · DISPO: pending consults and clinical improvement    CONSULTS:    IP CONSULT TO VASCULAR SURGERY    PROCEDURES:  Not indicated       PATIENT REFERRED TO:    Rakesh Mobley. Suite [facility] [address]  [phone]            --  Olya July, DO   Emergency Medicine Resident     This dictation was generated by voice recognition computer software. Although all attempts are made to edit the dictation for accuracy, there may be errors in the transcription that are not intended.

## 2022-01-01 NOTE — PROGRESS NOTES
[address] [facility]  CDU / OBSERVATION ENCOUNTER  ATTENDING NOTE       I performed a history and physical examination of the patient and discussed management with the resident or midlevel provider. I reviewed the resident or midlevel provider's note and agree with the documented findings and plan of care. Any areas of disagreement are noted on the chart. I was personally present for the key portions of any procedures. I have documented in the chart those procedures where I was not present during the key portions. I have reviewed the nurses notes. I agree with the chief complaint, past medical history, past surgical history, allergies, medications, social and family history as documented unless otherwise noted below. The Family history, social history, and ROS are effectively unchanged since admission unless noted elsewhere in the chart. Patient with propagation of known DVT. Patient has uncontrollable pain. History of DVT and PEs and has had Faustina filter placed. Patient had arthroscopic procedure of left knee on the 16th and developed worsening pain. Patient was diagnosed yesterday with popliteal DVT on the left. She started Eliquis and has taken the medication. Patient with worsening pain and pain proximal to the known DVT. Patient denies chest pain or shortness of breath. Patient with demonstrated propagation on ultrasound. Admitted for pain control and vascular evaluation. Vascular surgery recommending Eliquis and outpatient follow-up. I had a long discussion with patient regarding her longer-term care including being on anticoagulation secondary to having it clotting disorder and having had multiple episodes of clot. I feel the patient's best treatment is out of the hospital if she has the Eliquis necessary. Prescriptions been written for for ongoing treatment. Arrangements were made for follow-up ultrasound as outpatient.   Patient for vascular surgery to see as outpatient in follow-up. See vascular surgery note as well.     Tamela Deng MD  Attending Emergency  Physician

## 2022-01-03 ENCOUNTER — CARE COORDINATION (OUTPATIENT)
Dept: CASE MANAGEMENT | Age: 52
End: 2022-01-03

## 2022-01-03 ENCOUNTER — TELEPHONE (OUTPATIENT)
Dept: PRIMARY CARE CLINIC | Age: 52
End: 2022-01-03

## 2022-01-03 DIAGNOSIS — I82.412 ACUTE DEEP VEIN THROMBOSIS (DVT) OF LEFT FEMORAL VEIN (HCC): Primary | ICD-10-CM

## 2022-01-03 NOTE — TELEPHONE ENCOUNTER
Patient needs oral anticoagulation. The resident at [address] [place] [address] is not covered. Please find out if we have Eliquis samples for the patient.   10 mg BID x 7 days then 5 mg BID

## 2022-01-03 NOTE — CARE COORDINATION
[facility] Transitions Initial Follow Up Call    Call within 2 business days of discharge: Yes    Patient: Kayden Lipoma Patient : 1970   MRN: <[id number]>  Reason for Admission: DVT LLE  Discharge Date: 22 RARS: No data recorded    Last Discharge [facility]       Complaint Diagnosis Description Type Department Provider    21 Other Acute deep vein thrombosis (DVT) of left lower extremity, unspecified vein [facility]) ED to Hosp-Admission (Discharged) (ADMITTED) ELIZABETH Harmon MD; Arcenio Parks. .. Spoke with: 24 HOUR INITIAL CALL. No  Answer. Left  HIPPA compliant message to return call to this writer. Call back from Octavio. [place] states she has pain rated 6/10 in her left thigh to calf. Appetite and fluid intake is fair. Nauseated. Bowel and bladder WNL. 1111F medication reconciliation completed. Pt is taking Eliquis as directed. One week prescription obtained from the hospital.    Discussed f/u with PCP and Dr Chikis Zhou. Mckinley Zhou this morning and left a message. She has not yet contacted  [facility]. Pt. Has a doppler scheduled for tomorrow. Pt. Has transportation. She has no HHC or DME needs at this time. Will continue to follow. Transitions of Care Initial Call    Was this an external facility discharge? No Discharge Facility: n/a    Challenges to be reviewed by the provider   Additional needs identified to be addressed with provider: No  none             Method of communication with provider : none      Advance Care Planning:   Does patient have an Advance Directive: reviewed and current. Was this a readmission? No  Patient stated reason for admission: DVT LLE  Patients top risk factors for readmission: medical condition-DVT LLE    Care Transition Nurse (CTN) contacted the patient by telephone to perform post hospital discharge assessment. Verified name and  with patient as identifiers. Provided introduction to self, and explanation of the CTN role.      CTN reviewed discharge instructions, medical action plan and red flags with patient who verbalized understanding. Patient given an opportunity to ask questions and does not have any further questions or concerns at this time. Were discharge instructions available to patient? Yes. Reviewed appropriate site of care based on symptoms and resources available to patient including: PCP, Specialist, When to call 911 and [address]. The patient agrees to contact the PCP office for questions related to their healthcare. Medication reconciliation was performed with patient, who verbalizes understanding of administration of home medications. Advised obtaining a 90-day supply of all daily and as-needed medications. Covid Risk Education     Educated patient about risk for severe COVID-19 due to risk factors according to CDC guidelines. LPN CC reviewed discharge instructions, medical action plan and red flag symptoms with the patient who verbalized understanding. Discussed COVID vaccination status: Yes. Education provided on COVID-19 vaccination as appropriate. Discussed exposure protocols and quarantine with CDC Guidelines. Patient was given an opportunity to verbalize any questions and concerns and agrees to contact LPN CC or health care provider for questions related to their healthcare. Reviewed and educated patient on any new and changed medications related to discharge diagnosis. Was patient discharged with a pulse oximeter? No Discussed and confirmed pulse oximeter discharge instructions and when to notify provider or seek emergency care. LPN CC provided contact information. Plan for follow-up call in 5-7 days based on severity of symptoms and risk factors.   Plan for next call: symptom management-PAIN SWELLING LLE  follow up appointment-WITH PCP AND VASCULAR  medication management-CHRISTIANO Méndez 33: [address]    Non-face-to-face services provided:  Obtained and reviewed discharge summary and/or continuity of care documents    Care Transitions 24 Hour Call    Care Transitions Interventions         Follow Up  Future Appointments   Date Time Provider Andre Styles   1/4/2022  1:30 PM STV VASCULAR [facility] VASGABRIELLE Yusuf Degree, LPN     Severiano Child LPN Care Transitions Nurse   [phone]

## 2022-01-03 NOTE — PROGRESS NOTES
Spoke with patient on phone regarding Eliquis. Was discharged with instruction to follow up with PCP and vascular surgery. Vascular surgery unable to see patient until 1/12. Spoke with office at Dr. Aisha Elliott, her PCP. They are aware she needs eliquis and are working on prior auth needed for eliquis as patient failed coumadin in past.     Spoke with patient again on phone, she thought vascular surgery was to manage eliquis, will get in contact with her PCP.     Macario Delaney, DO

## 2022-01-03 NOTE — TELEPHONE ENCOUNTER
Patient states she has enough of the 10 mg. For 7 days BID. She would like new prescription sent [facility] [address].

## 2022-01-04 ENCOUNTER — HOSPITAL ENCOUNTER (OUTPATIENT)
Dept: VASCULAR LAB | Age: 52
Discharge: HOME | End: 2022-01-04
Payer: COMMERCIAL

## 2022-01-04 DIAGNOSIS — I82.402 ACUTE DEEP VEIN THROMBOSIS (DVT) OF LEFT LOWER EXTREMITY, UNSPECIFIED VEIN (HCC): ICD-10-CM

## 2022-01-04 PROCEDURE — 93970 EXTREMITY STUDY: CPT

## 2022-01-05 ENCOUNTER — TELEPHONE (OUTPATIENT)
Dept: PRIMARY CARE CLINIC | Age: 52
End: 2022-01-05

## 2022-01-05 NOTE — TELEPHONE ENCOUNTER
Pending on her type of work, may not need to extend longer. All depends how she is doing and how much leg swelling she is having.   Eliquis medication is a good treatment for her DVT

## 2022-01-05 NOTE — TELEPHONE ENCOUNTER
Pt called stating she recently had a total hysterectomy and developed a blood clot, she states she had a US done yesterday and is on FMLA but it is currently through her OBGYN. Her return to work date is 1/18/22. She has an appt on 1/11/22 for a hospital f/u  but is asking  If you could look over her [address] and hospital notes on 12/31/21 to see if there is a possibility she will have to extend her FMLA so she can start to get the paperwork going and keep her employer informed.        Please advise       Please call pt or states use my chart

## 2022-01-10 ENCOUNTER — TELEPHONE (OUTPATIENT)
Dept: PRIMARY CARE CLINIC | Age: 52
End: 2022-01-10

## 2022-01-10 NOTE — TELEPHONE ENCOUNTER
Patient states she is in need of a prior authorization for her medication - have you received request from pharmacy?

## 2022-01-10 NOTE — TELEPHONE ENCOUNTER
----- Message from Laural Serge sent at 1/8/2022 11:38 AM EST -----  Subject: Message to Provider    QUESTIONS  Information for Provider? Pooja Borden, Pt is calling because she   is almost out of her Eliquis for a blood clot in her leg. Her pharmacy   told her she needed a prior auth but pharmacist states they sent the   paperwork to Dr. Dionicio Borden and have no idea if Dr. Dionicio Borden sent it to the   insurance, or tried to send it back, or what. Pt's very last dose will be   5 mg Monday morning and then she is completely out. Office is closed for   the wknd. Pt is worrying & is requesting a call with an update Monday   morning.   ---------------------------------------------------------------------------  --------------  CALL BACK INFO  What is the best way for the office to contact you? OK to leave message on   voicemail  Preferred Call Back Phone Number? [phone]  ---------------------------------------------------------------------------  --------------  SCRIPT ANSWERS  Relationship to Patient?  Self

## 2022-01-10 NOTE — TELEPHONE ENCOUNTER
Patient called back asking of this would get resolved today so she could  script. I advised patient it can take 24 hours to get a response back but told her I could put samples upfront so she has those until the PA is resolved. Patient said she will just miss todays dose and pick the samples up tomorrow at her appt. 2 samples of eliquis 5mg put up front for patient.

## 2022-01-11 ENCOUNTER — OFFICE VISIT (OUTPATIENT)
Dept: PRIMARY CARE CLINIC | Age: 52
End: 2022-01-11
Payer: COMMERCIAL

## 2022-01-11 VITALS
BODY MASS INDEX: 19.96 KG/M2 | DIASTOLIC BLOOD PRESSURE: 70 MMHG | SYSTOLIC BLOOD PRESSURE: 126 MMHG | OXYGEN SATURATION: 99 % | WEIGHT: 124.2 LBS | HEART RATE: 87 BPM | HEIGHT: 66 IN

## 2022-01-11 DIAGNOSIS — I82.412 ACUTE DEEP VEIN THROMBOSIS (DVT) OF LEFT FEMORAL VEIN (HCC): Primary | ICD-10-CM

## 2022-01-11 PROCEDURE — 99495 TRANSJ CARE MGMT MOD F2F 14D: CPT | Performed by: FAMILY MEDICINE

## 2022-01-11 PROCEDURE — 1111F DSCHRG MED/CURRENT MED MERGE: CPT | Performed by: FAMILY MEDICINE

## 2022-01-11 SDOH — ECONOMIC STABILITY: FOOD INSECURITY: WITHIN THE PAST 12 MONTHS, YOU WORRIED THAT YOUR FOOD WOULD RUN OUT BEFORE YOU GOT MONEY TO BUY MORE.: NEVER TRUE

## 2022-01-11 SDOH — ECONOMIC STABILITY: FOOD INSECURITY: WITHIN THE PAST 12 MONTHS, THE FOOD YOU BOUGHT JUST DIDN'T LAST AND YOU DIDN'T HAVE MONEY TO GET MORE.: NEVER TRUE

## 2022-01-11 ASSESSMENT — PATIENT HEALTH QUESTIONNAIRE - PHQ9
1. LITTLE INTEREST OR PLEASURE IN DOING THINGS: 0
SUM OF ALL RESPONSES TO PHQ QUESTIONS 1-9: 0
SUM OF ALL RESPONSES TO PHQ QUESTIONS 1-9: 0
2. FEELING DOWN, DEPRESSED OR HOPELESS: 0
SUM OF ALL RESPONSES TO PHQ QUESTIONS 1-9: 0
SUM OF ALL RESPONSES TO PHQ9 QUESTIONS 1 & 2: 0
SUM OF ALL RESPONSES TO PHQ QUESTIONS 1-9: 0

## 2022-01-11 ASSESSMENT — ENCOUNTER SYMPTOMS
WHEEZING: 0
SORE THROAT: 0
VOMITING: 0
COUGH: 0
NAUSEA: 1
ABDOMINAL PAIN: 0
EYE DISCHARGE: 0
RHINORRHEA: 0
EYE REDNESS: 0
SHORTNESS OF BREATH: 0
DIARRHEA: 0

## 2022-01-11 ASSESSMENT — SOCIAL DETERMINANTS OF HEALTH (SDOH): HOW HARD IS IT FOR YOU TO PAY FOR THE VERY BASICS LIKE FOOD, HOUSING, MEDICAL CARE, AND HEATING?: NOT HARD AT ALL

## 2022-01-11 NOTE — PROGRESS NOTES
[address] [facility]  [address] [address]  [address] [address]  Dept: [id number] Aldo Hernandez is a 54 y.o. female Established patient, who presents today for her medical conditions/complaints as noted below. Chief Complaint   Patient presents with    Follow-Up from Hospital       HPI:     HPI    Pt returns to the office for a hospital follow up. Pt went to [facility] ER on 12/31 and was admitted. She was discharged 1/1. Pt states she went to the ER for a DVT  in her left lower leg. She started that the pain was so bad she could not walk. Pt states that she had a knee scope 12/17 and a hysterectomy on 12/6. At her follow up with ortho she knew she had a clot so they ordered a doppler and had her take Eliquis. She just ran out of Eliquis yesterday. Pt had a doppler performed before going to the ER. She then had a repeat one in the ER. She was told that it was larger. Pt states she has had blood clots before and she has a filter placed. She has MTHFR disorder. Pt admits to having chest pain and palpitations. She believes this is related to stress. She had a heart cath and a halter monitor before that was negative. Pt admits to sleep disturbances. She believes this is due to her hysterectomy. She has a follow up this afternoon. Pt denies shortness of breath, wheezing, nausea, vomiting, dizziness and lightheadedness.      Reviewed prior notes None  Reviewed previous Imaging    LDL Cholesterol (mg/dL)   Date Value   02/19/2019 50       (goal LDL is <100)   AST (U/L)   Date Value   12/31/2021 16     ALT (U/L)   Date Value   12/31/2021 15     BUN (mg/dL)   Date Value   12/31/2021 15     Hemoglobin A1C (%)   Date Value   02/18/2019 5.0     TSH (mIU/L)   Date Value   02/18/2019 1.85     BP Readings from Last 3 Encounters:   01/11/22 126/70   01/01/22 95/62   11/10/20 118/68          (goal 120/80)    Past Medical History:   Diagnosis Date    NSTEMI (non-ST elevated myocardial infarction) ([facility] [address] 01/28/2019    Pulmonary emboli (HCC)     when 24 YO      Past Surgical History:   Procedure Laterality Date    CARDIAC CATHETERIZATION  02/2019    CHOLECYSTECTOMY      VENA CAVA FILTER PLACEMENT      VENA CAVA FILTER PLACEMENT         Family History   Problem Relation Age of Onset    Breast Cancer Maternal Aunt     Cancer Paternal Uncle     Cancer Maternal Grandfather     Cancer Paternal Grandmother        Social History     Tobacco Use    Smoking status: Never Smoker    Smokeless tobacco: Never Used   Substance Use Topics    Alcohol use: Yes     Comment: social      Current Outpatient Medications   Medication Sig Dispense Refill    rivaroxaban (XARELTO) 20 MG TABS tablet Take 1 tablet by mouth daily (with breakfast) 30 tablet 5     No current facility-administered medications for this visit. Allergies   Allergen Reactions    Lamisil [Terbinafine Hcl]     Nubain [Nalbuphine Hcl]     Other      MRI contrast= severe feeling of burning sensation, tingling,  Numbness + nodlues       Health Maintenance   Topic Date Due    Hepatitis C screen  Never done    Depression Screen  Never done    HIV screen  Never done    DTaP/Tdap/Td vaccine (1 - Tdap) Never done    Colon cancer screen colonoscopy  Never done    COVID-19 Vaccine (3 - Booster for Moderna series) 08/09/2021    Lipid screen  02/19/2024    Flu vaccine  Completed    Hepatitis A vaccine  Aged Out    Hepatitis B vaccine  Aged Out    Hib vaccine  Aged Out    Meningococcal (ACWY) vaccine  Aged Out    Pneumococcal 0-64 years Vaccine  Aged Out       Subjective:      Review of Systems   Constitutional: Negative for chills and fever. HENT: Negative for rhinorrhea and sore throat. Eyes: Negative for discharge and redness. Respiratory: Negative for cough, shortness of breath and wheezing. Cardiovascular: Positive for chest pain and palpitations.    Gastrointestinal: Positive for nausea. Negative for abdominal pain, diarrhea and vomiting. Genitourinary: Negative for dysuria and frequency. Musculoskeletal: Negative for arthralgias and myalgias. Neurological: Negative for dizziness, light-headedness and headaches. Psychiatric/Behavioral: Positive for sleep disturbance. Objective:     /70   Pulse 87   Ht 5' 6\" (1.676 m)   Wt 124 lb 3.2 oz (56.3 kg)   LMP 11/01/2020 (Exact Date)   SpO2 99%   BMI 20.05 kg/m²   Physical Exam  Vitals and nursing note reviewed. Constitutional:       General: She is not in acute distress. Appearance: She is well-developed. She is not ill-appearing. HENT:      Head: Normocephalic and atraumatic. Right Ear: External ear normal.      Left Ear: External ear normal.   Eyes:      General: No scleral icterus. Right eye: No discharge. Left eye: No discharge. Conjunctiva/sclera: Conjunctivae normal.      Pupils: Pupils are equal, round, and reactive to light. Neck:      Thyroid: No thyromegaly. Trachea: No tracheal deviation. Cardiovascular:      Rate and Rhythm: Normal rate and regular rhythm. Heart sounds: Normal heart sounds. Pulmonary:      Effort: Pulmonary effort is normal. No respiratory distress. Breath sounds: Normal breath sounds. No wheezing. Lymphadenopathy:      Cervical: No cervical adenopathy. Skin:     General: Skin is warm. Findings: No rash. Neurological:      Mental Status: She is alert and oriented to person, place, and time. Psychiatric:         Mood and Affect: Mood normal.         Behavior: Behavior normal.         Thought Content: Thought content normal.         Assessment:       Diagnosis Orders   1.  Acute deep vein thrombosis (DVT) of left femoral vein (HCC)  DC DISCHARGE MEDS RECONCILED W/ CURRENT OUTPATIENT MED LIST    US DUP LOWER EXTREMITY LEFT YOHANNES        Plan:    change to Xarelto   Repeat venous scan 6 weeks  Advised need to continue Xarelto for 3-6 months    Return if symptoms worsen or fail to improve. Orders Placed This Encounter   Procedures    US DUP LOWER EXTREMITY LEFT YOHANNES     Standing Status:   Future     Standing Expiration Date:   1/11/2023     Order Specific Question:   Reason for exam:     Answer:   f/u dvt    [place] DISCHARGE MEDS RECONCILED W/ CURRENT OUTPATIENT MED LIST     Orders Placed This Encounter   Medications    rivaroxaban (XARELTO) 20 MG TABS tablet     Sig: Take 1 tablet by mouth daily (with breakfast)     Dispense:  30 tablet     Refill:  5       Patient given educational materials - see patient instructions. Discussed use, benefit, and side effects of prescribed medications. All patient questions answered. Pt voiced understanding. Reviewed health maintenance. Instructed to continue current medications, diet andexercise. Patient agreed with treatment plan. Follow up as directed.      Electronicallysigned by Yasmin Nieto MD on 1/11/2022 at 11:07 AM

## 2022-01-12 ENCOUNTER — TELEPHONE (OUTPATIENT)
Dept: PRIMARY CARE CLINIC | Age: 52
End: 2022-01-12

## 2022-01-12 NOTE — TELEPHONE ENCOUNTER
Patient is calling in and states she was in for a hospital follow up yesterday and discuss extending her leave due to her blood clot. Pt states she is originally supposed to return on 1/17/2022 but would like that extended. Pt wants to know when you think she is able to return to work? Pt states she is still having pain behind her knee and thigh. Please advise.

## 2022-01-12 NOTE — TELEPHONE ENCOUNTER
----- Message from Davion Torrez sent at 1/8/2022 11:38 AM EST -----  Subject: Message to Provider    QUESTIONS  Information for Provider? Megan Larry, Pt is calling because she   is almost out of her Eliquis for a blood clot in her leg. Her pharmacy   told her she needed a prior auth but pharmacist states they sent the   paperwork to Dr. Megan Larry and have no idea if Dr. Megan Larry sent it to the   insurance, or tried to send it back, or what. Pt's very last dose will be   5 mg Monday morning and then she is completely out. Office is closed for   the wknd. Pt is worrying & is requesting a call with an update Monday   morning.   ---------------------------------------------------------------------------  --------------  CALL BACK INFO  What is the best way for the office to contact you? OK to leave message on   voicemail  Preferred Call Back Phone Number? [phone]  ---------------------------------------------------------------------------  --------------  SCRIPT ANSWERS  Relationship to Patient?  Self

## 2022-01-12 NOTE — TELEPHONE ENCOUNTER
All depends when she feels that she can walk comfortably with minimal pain and discomfort.   Extend leave for 1 additional week and then reevaluate

## 2022-01-13 NOTE — TELEPHONE ENCOUNTER
Pt notified. Pts new RTW date will be 1/24/2022. Pt states she will call [employer] and have them fax the forms to us.

## 2022-01-26 ENCOUNTER — TELEPHONE (OUTPATIENT)
Dept: PRIMARY CARE CLINIC | Age: 52
End: 2022-01-26

## 2022-01-26 DIAGNOSIS — I82.812 SUPERFICIAL THROMBOSIS OF LEFT LOWER EXTREMITY: Primary | ICD-10-CM

## 2022-01-26 NOTE — TELEPHONE ENCOUNTER
She is having a lot of discomfort in her left leg where the blood clot it. More so than she did when she first started with it. She is trying to get back to work, but is concerned she is having more pain and discomfort than when it started. She is supposed to go back on 02/01/22 asking if she can get another week off and if the doppler can be redone while she is off that week. Please advise.

## 2022-01-27 NOTE — TELEPHONE ENCOUNTER
Pt notified of message, she will schedule repeat scan at [facility], she is going to have her work fax over [employer] forms to be filled out for the additional time off work. I advised her I will watch for forms to come in.

## 2022-02-01 ENCOUNTER — HOSPITAL ENCOUNTER (OUTPATIENT)
Dept: VASCULAR LAB | Age: 52
Discharge: HOME | End: 2022-02-01
Payer: COMMERCIAL

## 2022-02-01 DIAGNOSIS — I82.812 SUPERFICIAL THROMBOSIS OF LEFT LOWER EXTREMITY: ICD-10-CM

## 2022-02-01 PROCEDURE — 93971 EXTREMITY STUDY: CPT

## 2022-04-14 ENCOUNTER — TELEPHONE (OUTPATIENT)
Dept: PRIMARY CARE CLINIC | Age: 52
End: 2022-04-14

## 2022-04-14 DIAGNOSIS — M79.89 PAIN AND SWELLING OF LEFT LOWER LEG: Primary | ICD-10-CM

## 2022-04-14 DIAGNOSIS — M79.662 PAIN AND SWELLING OF LEFT LOWER LEG: Primary | ICD-10-CM

## 2022-04-14 NOTE — TELEPHONE ENCOUNTER
Patient thinks she has another blood clot in her left leg. She went off her Eliquis and has had a couple of surgeries. Wants to know if you can order a doppler.  Wants to have done at [place]. V's

## 2022-04-14 NOTE — TELEPHONE ENCOUNTER
Pt advised er, but states that she will schedule the doppler at [facility]. Francy.

## 2022-04-14 NOTE — TELEPHONE ENCOUNTER
Dr. Shawn Dick pt calling back. See other message. Pt has leg discomfort and knot behind her lt knee, like she had before with a blood clot. Advised ER, pt declines. Wants doppler ordered.